# Patient Record
Sex: MALE | Race: OTHER | Employment: FULL TIME | URBAN - METROPOLITAN AREA
[De-identification: names, ages, dates, MRNs, and addresses within clinical notes are randomized per-mention and may not be internally consistent; named-entity substitution may affect disease eponyms.]

---

## 2018-12-29 ENCOUNTER — APPOINTMENT (OUTPATIENT)
Dept: GENERAL RADIOLOGY | Age: 33
End: 2018-12-29
Attending: FAMILY MEDICINE
Payer: COMMERCIAL

## 2018-12-29 ENCOUNTER — HOSPITAL ENCOUNTER (OUTPATIENT)
Age: 33
Discharge: HOME | End: 2018-12-29
Attending: FAMILY MEDICINE
Payer: COMMERCIAL

## 2018-12-29 VITALS
TEMPERATURE: 98 F | DIASTOLIC BLOOD PRESSURE: 86 MMHG | SYSTOLIC BLOOD PRESSURE: 126 MMHG | HEART RATE: 103 BPM | RESPIRATION RATE: 16 BRPM | OXYGEN SATURATION: 97 %

## 2018-12-29 DIAGNOSIS — M79.672 LEFT FOOT PAIN: ICD-10-CM

## 2018-12-29 DIAGNOSIS — J02.9 ACUTE VIRAL PHARYNGITIS: ICD-10-CM

## 2018-12-29 DIAGNOSIS — J06.9 VIRAL UPPER RESPIRATORY TRACT INFECTION: Primary | ICD-10-CM

## 2018-12-29 DIAGNOSIS — M72.2 PLANTAR FASCIITIS OF LEFT FOOT: ICD-10-CM

## 2018-12-29 LAB — POCT RAPID STREP: NEGATIVE

## 2018-12-29 PROCEDURE — 99214 OFFICE O/P EST MOD 30 MIN: CPT

## 2018-12-29 PROCEDURE — 73630 X-RAY EXAM OF FOOT: CPT | Performed by: FAMILY MEDICINE

## 2018-12-29 PROCEDURE — 87430 STREP A AG IA: CPT | Performed by: FAMILY MEDICINE

## 2018-12-29 PROCEDURE — 87081 CULTURE SCREEN ONLY: CPT | Performed by: FAMILY MEDICINE

## 2018-12-29 RX ORDER — DICLOFENAC SODIUM 75 MG/1
75 TABLET, DELAYED RELEASE ORAL 2 TIMES DAILY
Qty: 20 TABLET | Refills: 0 | Status: SHIPPED | OUTPATIENT
Start: 2018-12-29 | End: 2019-06-07

## 2018-12-29 RX ORDER — ACETAMINOPHEN 500 MG
1000 TABLET ORAL ONCE
Status: COMPLETED | OUTPATIENT
Start: 2018-12-29 | End: 2018-12-29

## 2018-12-29 RX ORDER — MULTIVIT-MINS NO.63/IRON/FOLIC 27 MG-1 MG
1 TABLET ORAL DAILY
COMMUNITY
End: 2019-06-07

## 2018-12-29 RX ORDER — FLUTICASONE PROPIONATE 50 MCG
SPRAY, SUSPENSION (ML) NASAL
Qty: 1 INHALER | Refills: 0 | Status: SHIPPED | OUTPATIENT
Start: 2018-12-29 | End: 2019-06-07

## 2018-12-29 RX ORDER — ASCORBIC ACID 500 MG
500 TABLET ORAL DAILY
COMMUNITY
End: 2020-08-25

## 2018-12-29 NOTE — ED PROVIDER NOTES
Patient Seen in: [id number] [facility]    History   Patient presents with:  Cough/URI  Sore Throat    Stated Complaint: Cough sore throat congestion and drainage    HPI    This 58-year-old male presents to the office with 2 concerns.   The first (36.8 °C) (Temporal)   Resp 16   SpO2 97%         Physical Exam    General: WH/WN/WD, in no respiratory distress, A and O times 3  HEAD: Normocephalic, atraumatic  EYES: Sclera anicteric,  conjunctiva normal.  EARS: Tympanic membranes normal, EAC's normal. discussed. He is instructed to go the emergency room if he has increased difficulty breathing. He is to follow-up with his primary doctor in 3-5 days if his cough and cold symptoms are not improving.             Disposition and Plan     Clinical Impressio fasciitis and heel spur.

## 2018-12-29 NOTE — ED INITIAL ASSESSMENT (HPI)
Pt sts cough/cold symptoms, frontal HA, sore throat since last night. No known fever. Denies SOB. Also c/o intermittent piercing pain to left heel for the past 1 year.  Pain increases when walking after sitting for a period of time or when first getting ou

## 2019-06-01 ENCOUNTER — TELEPHONE (OUTPATIENT)
Dept: FAMILY MEDICINE CLINIC | Facility: CLINIC | Age: 34
End: 2019-06-01

## 2019-06-01 NOTE — TELEPHONE ENCOUNTER
Per patient no SI/HI, patient would like to be seen for depression. Requested Monday morning appointment due to work schedule.  Patient ok with seeing DB Friday   Future Appointments   Date Time Provider Rafal Mathews   6/7/2019 10:40 AM Eva Sampson

## 2019-06-01 NOTE — TELEPHONE ENCOUNTER
Pt states he has been feeling depressed and mood swings for a little while, Wants to be seen Monday, but needs early appt because he starts work in the afternoon.   Please call

## 2019-06-07 NOTE — PROGRESS NOTES
Patient presents with:  Mood Disturbance: mood, irritable       HPI:    Patient ID: Jose Francisco Seth is a 26year old male. C/O feeling easily irritable past 4 weeks. Feels nesbitt. Feels 'down' sometimes for no reason. Overthinks scenarios at times.  Gene Physical Exam   Vitals reviewed. Constitutional: He appears well-nourished. No distress. HENT:   Mouth/Throat: Oropharynx is clear and moist.   Neck: No thyromegaly present. Cardiovascular: Normal rate and regular rhythm. No murmur heard.   Pulmo

## 2019-07-05 NOTE — PROGRESS NOTES
Patient presents with: Follow - Up: on setraline       HPI:    Patient ID: Rick Mendieta is a 26year old male. Here to follow up on depression. Doing great since start zoloft. No med s/e. He is no longer nesbitt and irritable.   Feels happy and motiva heard.  Pulmonary/Chest: Effort normal and breath sounds normal.   Psychiatric: He has a normal mood and affect.  His behavior is normal. Judgment and thought content normal.            ASSESSMENT/PLAN:   Therapeutic drug monitoring  Other depression  (prim

## 2019-12-26 NOTE — TELEPHONE ENCOUNTER
Last refill - 7/5/19 - #90 with 1 refill  Last office visit - 7/5/19 - for depression  Future Appointments   Date Time Provider Rafal Mathews   1/23/2020 10:00 AM DO AGNES Watts [facility] Zahida Sosa     Please advise.

## 2020-03-18 ENCOUNTER — TELEPHONE (OUTPATIENT)
Dept: FAMILY MEDICINE CLINIC | Facility: CLINIC | Age: 35
End: 2020-03-18

## 2020-03-18 NOTE — TELEPHONE ENCOUNTER
Please advise. Most recent OV with Dr. Delgado Rolling was 7/5/19 for depression.     Future Appointments   Date Time Provider Rafal Mathews   3/19/2020  1:15 PM Radha Abdul DO [facility] E[facility] [place]

## 2020-03-18 NOTE — TELEPHONE ENCOUNTER
Since I have never seen him, I would prefer if Dr. Garth Salgado can refill, if possible. I think it's wise to postpone the visit.

## 2020-03-18 NOTE — TELEPHONE ENCOUNTER
Pt is due to come in tomorrow to Est care with KE. Formally he was seeing Dr Chelsea Saez. He needs a refill of his Zoloft. HE does work at [employer] and doesn't know if he has has contact with the confirmed case on Covid-19.    He currently h

## 2020-06-22 ENCOUNTER — TELEMEDICINE (OUTPATIENT)
Dept: FAMILY MEDICINE CLINIC | Facility: CLINIC | Age: 35
End: 2020-06-22

## 2020-06-22 DIAGNOSIS — F32.89 OTHER DEPRESSION: ICD-10-CM

## 2020-06-22 DIAGNOSIS — Z51.81 THERAPEUTIC DRUG MONITORING: Primary | ICD-10-CM

## 2020-06-22 PROCEDURE — 99213 OFFICE O/P EST LOW 20 MIN: CPT | Performed by: FAMILY MEDICINE

## 2020-06-22 NOTE — TELEPHONE ENCOUNTER
TAPAN pt has an appt today.   Future Appointments   Date Time Provider Rafal Mathews   6/22/2020  2:00 PM Ellen Freeman MD [facility]   8/4/2020  4:15 PM Joselo Desir [facility] Velma Escalonaral

## 2020-06-22 NOTE — PROGRESS NOTES
Patient presents with:  Medication Follow-Up        HPI:    Patient ID: Gwen Kimball is a 37year old male doing a video visit today for med check. Doing well on zoloft. Depression sx are well controlled. No med s/e. Motivation good.  Denies moodiness zoloft. Side effects and therapeutic benefits of medication reviewed  F/u for annual px/BW in august 2020. No orders of the defined types were placed in this encounter.       Meds This Visit:  Requested Prescriptions      No prescriptions requested or

## 2020-08-20 ENCOUNTER — TELEPHONE (OUTPATIENT)
Dept: FAMILY MEDICINE CLINIC | Facility: CLINIC | Age: 35
End: 2020-08-20

## 2020-08-20 NOTE — TELEPHONE ENCOUNTER
Patient wife notified and accepted appt. Advised if he receives a reminder call it will say scheduled for 830.  Advised to disregard, appointment time is 8 am

## 2020-08-20 NOTE — TELEPHONE ENCOUNTER
Spouse called, pt is est care with Dr Kaitlyn Saul from Dr Chelsea Saez, Pt just got a new job, his physical is next Thursday and they want to know if we can fit pt in for physical sooner?   Pt can not start job until he has physical.  Please call pt at [phone]

## 2020-08-25 ENCOUNTER — OFFICE VISIT (OUTPATIENT)
Dept: FAMILY MEDICINE CLINIC | Facility: CLINIC | Age: 35
End: 2020-08-25

## 2020-08-25 ENCOUNTER — LAB ENCOUNTER (OUTPATIENT)
Dept: LAB | Age: 35
End: 2020-08-25
Attending: FAMILY MEDICINE
Payer: COMMERCIAL

## 2020-08-25 VITALS
SYSTOLIC BLOOD PRESSURE: 120 MMHG | BODY MASS INDEX: 33.92 KG/M2 | WEIGHT: 247.69 LBS | DIASTOLIC BLOOD PRESSURE: 84 MMHG | HEIGHT: 71.5 IN | HEART RATE: 84 BPM | TEMPERATURE: 97 F

## 2020-08-25 DIAGNOSIS — Z00.00 HEALTHY ADULT ON ROUTINE PHYSICAL EXAMINATION: ICD-10-CM

## 2020-08-25 DIAGNOSIS — R06.83 SNORING: ICD-10-CM

## 2020-08-25 DIAGNOSIS — R53.82 CHRONIC FATIGUE: ICD-10-CM

## 2020-08-25 DIAGNOSIS — Z11.1 SCREENING FOR TUBERCULOSIS: ICD-10-CM

## 2020-08-25 DIAGNOSIS — R74.8 ELEVATED LIVER ENZYMES: ICD-10-CM

## 2020-08-25 DIAGNOSIS — F32.89 OTHER DEPRESSION: ICD-10-CM

## 2020-08-25 DIAGNOSIS — Z00.00 HEALTHY ADULT ON ROUTINE PHYSICAL EXAMINATION: Primary | ICD-10-CM

## 2020-08-25 LAB
ALBUMIN SERPL-MCNC: 4 G/DL (ref 3.4–5)
ALBUMIN/GLOB SERPL: 1 {RATIO} (ref 1–2)
ALP LIVER SERPL-CCNC: 114 U/L (ref 45–117)
ALT SERPL-CCNC: 182 U/L (ref 16–61)
ANION GAP SERPL CALC-SCNC: 4 MMOL/L (ref 0–18)
AST SERPL-CCNC: 83 U/L (ref 15–37)
BASOPHILS # BLD AUTO: 0.04 X10(3) UL (ref 0–0.2)
BASOPHILS NFR BLD AUTO: 0.6 %
BILIRUB SERPL-MCNC: 0.6 MG/DL (ref 0.1–2)
BUN BLD-MCNC: 16 MG/DL (ref 7–18)
BUN/CREAT SERPL: 18.2 (ref 10–20)
CALCIUM BLD-MCNC: 9.4 MG/DL (ref 8.5–10.1)
CHLORIDE SERPL-SCNC: 107 MMOL/L (ref 98–112)
CHOLEST SMN-MCNC: 206 MG/DL (ref ?–200)
CO2 SERPL-SCNC: 28 MMOL/L (ref 21–32)
CREAT BLD-MCNC: 0.88 MG/DL (ref 0.7–1.3)
DEPRECATED RDW RBC AUTO: 44.3 FL (ref 35.1–46.3)
EOSINOPHIL # BLD AUTO: 0.14 X10(3) UL (ref 0–0.7)
EOSINOPHIL NFR BLD AUTO: 2.1 %
ERYTHROCYTE [DISTWIDTH] IN BLOOD BY AUTOMATED COUNT: 13.1 % (ref 11–15)
GLOBULIN PLAS-MCNC: 3.9 G/DL (ref 2.8–4.4)
GLUCOSE BLD-MCNC: 113 MG/DL (ref 70–99)
HCT VFR BLD AUTO: 47.9 % (ref 39–53)
HDLC SERPL-MCNC: 48 MG/DL (ref 40–59)
HGB BLD-MCNC: 15.8 G/DL (ref 13–17.5)
IMM GRANULOCYTES # BLD AUTO: 0.03 X10(3) UL (ref 0–1)
IMM GRANULOCYTES NFR BLD: 0.4 %
LDLC SERPL CALC-MCNC: 129 MG/DL (ref ?–100)
LYMPHOCYTES # BLD AUTO: 2.61 X10(3) UL (ref 1–4)
LYMPHOCYTES NFR BLD AUTO: 38.7 %
M PROTEIN MFR SERPL ELPH: 7.9 G/DL (ref 6.4–8.2)
MCH RBC QN AUTO: 30.7 PG (ref 26–34)
MCHC RBC AUTO-ENTMCNC: 33 G/DL (ref 31–37)
MCV RBC AUTO: 93 FL (ref 80–100)
MONOCYTES # BLD AUTO: 0.45 X10(3) UL (ref 0.1–1)
MONOCYTES NFR BLD AUTO: 6.7 %
NEUTROPHILS # BLD AUTO: 3.48 X10 (3) UL (ref 1.5–7.7)
NEUTROPHILS # BLD AUTO: 3.48 X10(3) UL (ref 1.5–7.7)
NEUTROPHILS NFR BLD AUTO: 51.5 %
NONHDLC SERPL-MCNC: 158 MG/DL (ref ?–130)
OSMOLALITY SERPL CALC.SUM OF ELEC: 290 MOSM/KG (ref 275–295)
PATIENT FASTING Y/N/NP: NO
PATIENT FASTING Y/N/NP: NO
PLATELET # BLD AUTO: 220 10(3)UL (ref 150–450)
POTASSIUM SERPL-SCNC: 3.5 MMOL/L (ref 3.5–5.1)
RBC # BLD AUTO: 5.15 X10(6)UL (ref 4.3–5.7)
SODIUM SERPL-SCNC: 139 MMOL/L (ref 136–145)
TRIGL SERPL-MCNC: 144 MG/DL (ref 30–149)
TSI SER-ACNC: 1.24 MIU/ML (ref 0.36–3.74)
VLDLC SERPL CALC-MCNC: 29 MG/DL (ref 0–30)
WBC # BLD AUTO: 6.8 X10(3) UL (ref 4–11)

## 2020-08-25 PROCEDURE — 83550 IRON BINDING TEST: CPT

## 2020-08-25 PROCEDURE — 99395 PREV VISIT EST AGE 18-39: CPT | Performed by: FAMILY MEDICINE

## 2020-08-25 PROCEDURE — 3079F DIAST BP 80-89 MM HG: CPT | Performed by: FAMILY MEDICINE

## 2020-08-25 PROCEDURE — 83540 ASSAY OF IRON: CPT

## 2020-08-25 PROCEDURE — 84443 ASSAY THYROID STIM HORMONE: CPT

## 2020-08-25 PROCEDURE — 80061 LIPID PANEL: CPT

## 2020-08-25 PROCEDURE — 3008F BODY MASS INDEX DOCD: CPT | Performed by: FAMILY MEDICINE

## 2020-08-25 PROCEDURE — 85025 COMPLETE CBC W/AUTO DIFF WBC: CPT

## 2020-08-25 PROCEDURE — 3074F SYST BP LT 130 MM HG: CPT | Performed by: FAMILY MEDICINE

## 2020-08-25 PROCEDURE — 86480 TB TEST CELL IMMUN MEASURE: CPT

## 2020-08-25 PROCEDURE — 80053 COMPREHEN METABOLIC PANEL: CPT

## 2020-08-25 PROCEDURE — 36415 COLL VENOUS BLD VENIPUNCTURE: CPT

## 2020-08-25 NOTE — PROGRESS NOTES
Marvin Pollard is a 38year old male who presents for a complete physical exam.   HPI:   Pt complains of needs a physical for work. 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 Just switched from nights for 11 yrs, to day shift. He is excited about this.      On yes. Children: no.   Exercise: minimal.  Diet: watches minimally     REVIEW OF SYSTEMS:   GENERAL: feels well otherwise  SKIN: denies any unusual skin lesions  EYES:denies blurred vision or double vision  HEENT: denies nasal congestion, sinus pain or ST  L Healthy adult on routine physical examination  (primary encounter diagnosis)  Screening for tuberculosis  Chronic fatigue  Snoring  Other depression  - check labs, recommended eating healthy and exercise more 150 min per week.    - if not feeling better

## 2020-08-26 ENCOUNTER — TELEPHONE (OUTPATIENT)
Dept: FAMILY MEDICINE CLINIC | Facility: CLINIC | Age: 35
End: 2020-08-26

## 2020-08-26 DIAGNOSIS — R74.8 ELEVATED LIVER ENZYMES: Primary | ICD-10-CM

## 2020-08-26 LAB
IRON SATURATION: 25 % (ref 20–50)
IRON SERPL-MCNC: 100 UG/DL (ref 65–175)
TOTAL IRON BINDING CAPACITY: 401 UG/DL (ref 240–450)
TRANSFERRIN SERPL-MCNC: 269 MG/DL (ref 200–360)

## 2020-08-26 NOTE — TELEPHONE ENCOUNTER
RN: Can we add iron/tibc and acute hepatitis panel dx elevated liver enzymes?    Pls let pt know that his tb test is still in process, but his thyroid and blood cell counts are normal. His cholesterol is a little high, but not at a level I would recommend m

## 2020-08-26 NOTE — TELEPHONE ENCOUNTER
Pt was advised- he states no ETOH or Tylenol use leading up to labs    RN advised pt that Melanie Callejas was added on and we will call with results when they are in    Routing to provider as Sylvester Simmonds

## 2020-08-27 ENCOUNTER — TELEPHONE (OUTPATIENT)
Dept: FAMILY MEDICINE CLINIC | Facility: CLINIC | Age: 35
End: 2020-08-27

## 2020-08-27 DIAGNOSIS — R74.8 ELEVATED LIVER ENZYMES: Primary | ICD-10-CM

## 2020-08-27 LAB
M TB IFN-G CD4+ T-CELLS BLD-ACNC: 0.02 IU/ML
M TB TUBERC IFN-G BLD QL: NEGATIVE
M TB TUBERC IGNF/MITOGEN IGNF CONTROL: 8.01 IU/ML
QUANTIFERON TB1 MINUS NIL: 0.01 IU/ML
QUANTIFERON TB2 MINUS NIL: 0.01 IU/ML

## 2020-08-27 NOTE — TELEPHONE ENCOUNTER
SPOUSE CALLED AND ADV SHE HAS A FEW MORE QUESTIONS THAT SHE WOULD LIKE TO ASK.     [address] [facility]

## 2020-08-27 NOTE — TELEPHONE ENCOUNTER
Spouse states pt does drink gatorade and powerade about 1-2 daily. Denies any tylenol or advil or drinking. Spouse would like liver testing repeated sooner. Per verbal from Dr. Ngoc brumfield to have these tests done sooner.  Advised pt to stop sports drinks and

## 2021-01-18 ENCOUNTER — OFFICE VISIT (OUTPATIENT)
Dept: FAMILY MEDICINE CLINIC | Facility: CLINIC | Age: 36
End: 2021-01-18

## 2021-01-18 VITALS
OXYGEN SATURATION: 98 % | BODY MASS INDEX: 35.14 KG/M2 | SYSTOLIC BLOOD PRESSURE: 118 MMHG | WEIGHT: 251 LBS | HEART RATE: 78 BPM | RESPIRATION RATE: 16 BRPM | TEMPERATURE: 98 F | DIASTOLIC BLOOD PRESSURE: 86 MMHG | HEIGHT: 71 IN

## 2021-01-18 DIAGNOSIS — R30.0 DYSURIA: ICD-10-CM

## 2021-01-18 DIAGNOSIS — R63.5 WEIGHT GAIN: ICD-10-CM

## 2021-01-18 DIAGNOSIS — M54.50 ACUTE BILATERAL LOW BACK PAIN WITHOUT SCIATICA: Primary | ICD-10-CM

## 2021-01-18 DIAGNOSIS — R74.8 ELEVATED LIVER ENZYMES: ICD-10-CM

## 2021-01-18 LAB
ALBUMIN SERPL-MCNC: 4.1 G/DL (ref 3.4–5)
ALP LIVER SERPL-CCNC: 102 U/L
ALT SERPL-CCNC: 125 U/L
ANION GAP SERPL CALC-SCNC: 5 MMOL/L (ref 0–18)
APPEARANCE: CLEAR
AST SERPL-CCNC: 52 U/L (ref 15–37)
BILIRUB DIRECT SERPL-MCNC: 0.2 MG/DL (ref 0–0.2)
BILIRUB SERPL-MCNC: 0.6 MG/DL (ref 0.1–2)
BILIRUBIN: NEGATIVE
BUN BLD-MCNC: 12 MG/DL (ref 7–18)
BUN/CREAT SERPL: 14.3 (ref 10–20)
CALCIUM BLD-MCNC: 9 MG/DL (ref 8.5–10.1)
CHLORIDE SERPL-SCNC: 103 MMOL/L (ref 98–112)
CO2 SERPL-SCNC: 27 MMOL/L (ref 21–32)
CREAT BLD-MCNC: 0.84 MG/DL
GLUCOSE (URINE DIPSTICK): NEGATIVE MG/DL
GLUCOSE BLD-MCNC: 112 MG/DL (ref 70–99)
HAV IGM SER QL: NONREACTIVE
HBV CORE IGM SER QL: NONREACTIVE
HBV SURFACE AG SERPL QL IA: NONREACTIVE
HCV AB SERPL QL IA: NONREACTIVE
KETONES (URINE DIPSTICK): NEGATIVE MG/DL
LEUKOCYTES: NEGATIVE
M PROTEIN MFR SERPL ELPH: 7.6 G/DL (ref 6.4–8.2)
MULTISTIX LOT#: 1044 NUMERIC
NITRITE, URINE: NEGATIVE
OCCULT BLOOD: NEGATIVE
OSMOLALITY SERPL CALC.SUM OF ELEC: 281 MOSM/KG (ref 275–295)
PATIENT FASTING Y/N/NP: NO
PH, URINE: 8 (ref 4.5–8)
POTASSIUM SERPL-SCNC: 3.7 MMOL/L (ref 3.5–5.1)
PROTEIN (URINE DIPSTICK): NEGATIVE MG/DL
SODIUM SERPL-SCNC: 135 MMOL/L (ref 136–145)
SPECIFIC GRAVITY: 1.01 (ref 1–1.03)
URINE-COLOR: YELLOW
UROBILINOGEN,SEMI-QN: 0.2 MG/DL (ref 0–1.9)

## 2021-01-18 PROCEDURE — 81003 URINALYSIS AUTO W/O SCOPE: CPT | Performed by: FAMILY MEDICINE

## 2021-01-18 PROCEDURE — 99214 OFFICE O/P EST MOD 30 MIN: CPT | Performed by: FAMILY MEDICINE

## 2021-01-18 PROCEDURE — 3074F SYST BP LT 130 MM HG: CPT | Performed by: FAMILY MEDICINE

## 2021-01-18 PROCEDURE — 80048 BASIC METABOLIC PNL TOTAL CA: CPT | Performed by: FAMILY MEDICINE

## 2021-01-18 PROCEDURE — 80074 ACUTE HEPATITIS PANEL: CPT | Performed by: FAMILY MEDICINE

## 2021-01-18 PROCEDURE — 80076 HEPATIC FUNCTION PANEL: CPT | Performed by: FAMILY MEDICINE

## 2021-01-18 PROCEDURE — 3079F DIAST BP 80-89 MM HG: CPT | Performed by: FAMILY MEDICINE

## 2021-01-18 PROCEDURE — 3008F BODY MASS INDEX DOCD: CPT | Performed by: FAMILY MEDICINE

## 2021-01-18 NOTE — PROGRESS NOTES
Rosemary Yoder is a 38year old male. Patient presents with:  Pain: low back pain, kidney pain      HPI:   Started last week with stabbing pain in left lower back \"kidney area\". Some on right side as well. Was trying to drink more.  Yesterday it was w unusual skin lesions or rashes  RESPIRATORY: denies shortness of breath with exertion  CARDIOVASCULAR: denies chest pain on exertion  GI: denies abdominal pain and denies heartburn  NEURO: denies headaches    EXAM:   /86 (BP Location: Left arm, Patie Order Specific Question: Release to patient          Answer: Immediate              Meds & Refills for this Visit:  Requested Prescriptions      No prescriptions requested or ordered in this encounter             The patient indicates understanding of t

## 2021-02-17 ENCOUNTER — PATIENT MESSAGE (OUTPATIENT)
Dept: FAMILY MEDICINE CLINIC | Facility: CLINIC | Age: 36
End: 2021-02-17

## 2021-02-17 NOTE — TELEPHONE ENCOUNTER
From: Iliana Garner  To: Lanny Ramirez,   Sent: 2/17/2021 5:15 PM CST  Subject: Other    Althea Momin,    I received the Moderna vaccine from [facility] on 1/19/21 and 2/17/21. Please update my immunization records.     Thank

## 2021-02-18 ENCOUNTER — TELEPHONE (OUTPATIENT)
Dept: FAMILY MEDICINE CLINIC | Facility: CLINIC | Age: 36
End: 2021-02-18

## 2021-03-26 DIAGNOSIS — F39 MOOD DISORDER (HCC): ICD-10-CM

## 2021-03-26 RX ORDER — ESCITALOPRAM OXALATE 10 MG/1
TABLET ORAL
Qty: 30 TABLET | Refills: 1 | Status: SHIPPED | OUTPATIENT
Start: 2021-03-26 | End: 2021-06-01

## 2021-03-26 NOTE — TELEPHONE ENCOUNTER
Patient notified and verbalized understanding. States he feels much better than he did on the other medication. Patient aware OV needed before further refills. States he will need to call back to schedule    KE notified of patient comments.

## 2021-03-26 NOTE — TELEPHONE ENCOUNTER
Script sent. Is it helping? Follow up with me before more refills since this was a new med in January.

## 2021-03-26 NOTE — TELEPHONE ENCOUNTER
No refill protocol for this medication. Last refill: 2-2-2021 #30 with 1 refill  Last Visit: 1-  Next Visit: No future appointments. Forward to Dr. Divya Hernandez please advise on refills. Thanks.

## 2021-06-01 DIAGNOSIS — F39 MOOD DISORDER (HCC): ICD-10-CM

## 2021-06-01 RX ORDER — ESCITALOPRAM OXALATE 10 MG/1
10 TABLET ORAL DAILY
Qty: 30 TABLET | Refills: 0 | Status: SHIPPED | OUTPATIENT
Start: 2021-06-01 | End: 2021-06-03

## 2021-06-01 NOTE — TELEPHONE ENCOUNTER
He is due for OV, see phone note 3/26/21. Please schedule. VV is okay. I can fill until his appointment.

## 2021-06-01 NOTE — TELEPHONE ENCOUNTER
Pt has been scheduled for video visit:    Future Appointments   Date Time Provider Rafal Mathews   6/3/2021  4:15 PM [facility] Kimi Ortega   6/26/2021  9:45 AM PF [facility] PF [facility]     Pt does need a refill, forward to Dr. Dinero Friday

## 2021-06-01 NOTE — TELEPHONE ENCOUNTER
No refill protocol for this medication. Last refill: 3/26/2021 #30 with 1 refill  Last Visit: 1/18/2021  Next Visit: No future appointments    Forward to Dr. Martha Muro please advise on refills. Thanks.

## 2021-06-03 NOTE — PROGRESS NOTES
This is a telemedicine visit with live, interactive video and audio. Patient understands and accepts financial responsibility for any deductible, co-insurance and/or co-pays associated with this service.     SUBJECTIVE  Medication follow up: started norm and all orders for this visit:    Mood disorder ([facility] [address]  -     escitalopram 10 MG Oral Tab; Take 1 tablet (10 mg total) by mouth daily. - continue current medication, he is happy with this. Doesn't want to change dose or med at this time.      Drug-induced e

## 2021-06-04 ENCOUNTER — TELEPHONE (OUTPATIENT)
Dept: FAMILY MEDICINE CLINIC | Facility: CLINIC | Age: 36
End: 2021-06-04

## 2021-06-04 DIAGNOSIS — N52.2 DRUG-INDUCED ERECTILE DYSFUNCTION: ICD-10-CM

## 2021-06-04 RX ORDER — SILDENAFIL 50 MG/1
TABLET, FILM COATED ORAL
Qty: 10 TABLET | Refills: 0 | Status: SHIPPED | OUTPATIENT
Start: 2021-06-04

## 2021-06-04 NOTE — TELEPHONE ENCOUNTER
Fax from [facility] stating sildenafil not covered    Per good RX can get at [facility] for $10    Patient notified and verbalized understanding.    Request script sent to [place]    Routed to  to resend    Fax sent to cancel at [place]

## 2021-06-26 ENCOUNTER — HOSPITAL ENCOUNTER (OUTPATIENT)
Dept: ULTRASOUND IMAGING | Age: 36
Discharge: HOME | End: 2021-06-26
Attending: FAMILY MEDICINE
Payer: COMMERCIAL

## 2021-06-26 DIAGNOSIS — R74.8 ELEVATED LIVER ENZYMES: ICD-10-CM

## 2021-06-26 PROCEDURE — 76700 US EXAM ABDOM COMPLETE: CPT | Performed by: FAMILY MEDICINE

## 2021-06-28 DIAGNOSIS — F39 MOOD DISORDER (HCC): ICD-10-CM

## 2021-06-28 RX ORDER — ESCITALOPRAM OXALATE 10 MG/1
10 TABLET ORAL DAILY
Qty: 90 TABLET | Refills: 0 | Status: SHIPPED | OUTPATIENT
Start: 2021-06-28 | End: 2021-09-13

## 2021-06-28 NOTE — TELEPHONE ENCOUNTER
PT CALLED AND ADV NEEDS REFILL -PT WOULD PREFER A 90 DAY REFILL     escitalopram 10 MG Oral Tab      LUZ HAGER       THANK YOU

## 2021-06-28 NOTE — TELEPHONE ENCOUNTER
Last OV 6/3/21 telemed, 1/18/21 office  Last refilled 6/3/21  #90 0 refills to [facility] and spoke with Brian Aguirre at [place] who confirmed 6/3 script not picked up.   Katherine llamas    Routed to  to resend to [facility] [address]

## 2021-07-28 ENCOUNTER — TELEPHONE (OUTPATIENT)
Dept: FAMILY MEDICINE CLINIC | Facility: CLINIC | Age: 36
End: 2021-07-28

## 2021-08-09 ENCOUNTER — TELEMEDICINE (OUTPATIENT)
Dept: FAMILY MEDICINE CLINIC | Facility: CLINIC | Age: 36
End: 2021-08-09

## 2021-08-09 DIAGNOSIS — R12 HEARTBURN: ICD-10-CM

## 2021-08-09 DIAGNOSIS — K29.00 ACUTE GASTRITIS WITHOUT HEMORRHAGE, UNSPECIFIED GASTRITIS TYPE: Primary | ICD-10-CM

## 2021-08-09 PROCEDURE — 99214 OFFICE O/P EST MOD 30 MIN: CPT | Performed by: FAMILY MEDICINE

## 2021-08-09 RX ORDER — OMEPRAZOLE 40 MG/1
40 CAPSULE, DELAYED RELEASE ORAL DAILY
Qty: 30 CAPSULE | Refills: 0 | Status: SHIPPED | OUTPATIENT
Start: 2021-08-09 | End: 2021-09-08

## 2021-08-09 NOTE — PROGRESS NOTES
This is a telemedicine visit with live, interactive video and audio. Patient understands and accepts financial responsibility for any deductible, co-insurance and/or co-pays associated with this service.     SUBJECTIVE  Over the weekend and the past wee Normocephalic, without obvious abnormality, atraumatic, lips, mucosa, and tongue normal; teeth and gums normal, Speaking in full sentences comfortably, Normal work of breathing and no abd tenderness with his palpation.      ASSESSMENT & PLAN  Diagnoses and

## 2021-09-12 DIAGNOSIS — F39 MOOD DISORDER (HCC): ICD-10-CM

## 2021-09-13 RX ORDER — ESCITALOPRAM OXALATE 10 MG/1
10 TABLET ORAL DAILY
Qty: 90 TABLET | Refills: 0 | Status: SHIPPED | OUTPATIENT
Start: 2021-09-13 | End: 2021-12-28

## 2021-12-28 DIAGNOSIS — F39 MOOD DISORDER (HCC): ICD-10-CM

## 2021-12-28 RX ORDER — ESCITALOPRAM OXALATE 10 MG/1
TABLET ORAL
Qty: 30 TABLET | Refills: 0 | Status: SHIPPED | OUTPATIENT
Start: 2021-12-28 | End: 2022-01-01

## 2022-02-27 DIAGNOSIS — F39 MOOD DISORDER (HCC): ICD-10-CM

## 2022-03-01 RX ORDER — ESCITALOPRAM OXALATE 10 MG/1
10 TABLET ORAL DAILY
Qty: 90 TABLET | Refills: 0 | Status: SHIPPED | OUTPATIENT
Start: 2022-03-01 | End: 2022-05-09

## 2022-03-01 NOTE — TELEPHONE ENCOUNTER
No refill protocol for this medication. Last refill: 1/01/2022 #30 with 0 refills  Last Visit: 8/09/2021 telemed  Next Visit: No future appointments. Forward to Dr. Dalia Pitts please advise on refills. Thanks.

## 2022-05-09 RX ORDER — SILDENAFIL 50 MG/1
TABLET, FILM COATED ORAL
Qty: 10 TABLET | Refills: 0 | Status: SHIPPED | OUTPATIENT
Start: 2022-05-09

## 2022-05-09 RX ORDER — ESCITALOPRAM OXALATE 10 MG/1
10 TABLET ORAL DAILY
Qty: 90 TABLET | Refills: 0 | Status: SHIPPED | OUTPATIENT
Start: 2022-05-09

## 2022-05-09 NOTE — TELEPHONE ENCOUNTER
Last OV 8/9/21   Last refilled:  3/1/22 escitalopram  #90  0 refills  6/4/21  Sildenafil  #10  0 refills    No future appointments.

## 2022-05-09 NOTE — TELEPHONE ENCOUNTER
Requests refill  We may already have a request from [employer] but he is switching to [place]    Sildenafil Citrate 50 MG Oral Tab    escitalopram 10 MG Oral Tab    [facility]

## 2022-11-04 ENCOUNTER — TELEPHONE (OUTPATIENT)
Dept: FAMILY MEDICINE CLINIC | Facility: CLINIC | Age: 37
End: 2022-11-04

## 2022-11-04 NOTE — TELEPHONE ENCOUNTER
Pt on video call with son, also sick this week. \" Dad also stayed home from work and is feeling better. Dad would like a doctors note for work. He missed Wednesday, yesterday, and today. Would like to return to work on Monday. Mil ALMANZAR 1985). \"     Will write a note, but needs to be seen if worsening and needs more time off. Note sent to Treatsie.

## 2023-02-10 ENCOUNTER — OFFICE VISIT (OUTPATIENT)
Dept: FAMILY MEDICINE CLINIC | Facility: CLINIC | Age: 38
End: 2023-02-10
Payer: COMMERCIAL

## 2023-02-10 VITALS
SYSTOLIC BLOOD PRESSURE: 122 MMHG | BODY MASS INDEX: 31.41 KG/M2 | TEMPERATURE: 97 F | DIASTOLIC BLOOD PRESSURE: 78 MMHG | HEIGHT: 73 IN | WEIGHT: 237 LBS | HEART RATE: 72 BPM

## 2023-02-10 DIAGNOSIS — R68.89 HEAT INTOLERANCE: ICD-10-CM

## 2023-02-10 DIAGNOSIS — R42 DIZZINESS: ICD-10-CM

## 2023-02-10 DIAGNOSIS — R63.1 EXCESSIVE THIRST: Primary | ICD-10-CM

## 2023-02-10 DIAGNOSIS — Z83.3 FAMILY HISTORY OF DIABETES MELLITUS IN FATHER: ICD-10-CM

## 2023-02-10 DIAGNOSIS — Z00.00 PREVENTATIVE HEALTH CARE: ICD-10-CM

## 2023-02-10 LAB
ALBUMIN SERPL-MCNC: 3.9 G/DL (ref 3.4–5)
ALBUMIN/GLOB SERPL: 1 {RATIO} (ref 1–2)
ALP LIVER SERPL-CCNC: 226 U/L
ALT SERPL-CCNC: 91 U/L
ANION GAP SERPL CALC-SCNC: 9 MMOL/L (ref 0–18)
AST SERPL-CCNC: 52 U/L (ref 15–37)
BASOPHILS # BLD AUTO: 0.05 X10(3) UL (ref 0–0.2)
BASOPHILS NFR BLD AUTO: 0.7 %
BILIRUB SERPL-MCNC: 0.9 MG/DL (ref 0.1–2)
BUN BLD-MCNC: 12 MG/DL (ref 7–18)
CALCIUM BLD-MCNC: 9.3 MG/DL (ref 8.5–10.1)
CHLORIDE SERPL-SCNC: 102 MMOL/L (ref 98–112)
CHOLEST SERPL-MCNC: 172 MG/DL (ref ?–200)
CO2 SERPL-SCNC: 25 MMOL/L (ref 21–32)
CREAT BLD-MCNC: 0.93 MG/DL
EOSINOPHIL # BLD AUTO: 0.17 X10(3) UL (ref 0–0.7)
EOSINOPHIL NFR BLD AUTO: 2.4 %
ERYTHROCYTE [DISTWIDTH] IN BLOOD BY AUTOMATED COUNT: 12.7 %
EST. AVERAGE GLUCOSE BLD GHB EST-MCNC: 275 MG/DL (ref 68–126)
FASTING PATIENT LIPID ANSWER: YES
FASTING STATUS PATIENT QL REPORTED: YES
GFR SERPLBLD BASED ON 1.73 SQ M-ARVRAT: 107 ML/MIN/1.73M2 (ref 60–?)
GLOBULIN PLAS-MCNC: 3.8 G/DL (ref 2.8–4.4)
GLUCOSE BLD-MCNC: 456 MG/DL (ref 70–99)
HBA1C MFR BLD: 11.2 % (ref ?–5.7)
HCT VFR BLD AUTO: 45.5 %
HDLC SERPL-MCNC: 28 MG/DL (ref 40–59)
HGB BLD-MCNC: 16.5 G/DL
IMM GRANULOCYTES # BLD AUTO: 0.04 X10(3) UL (ref 0–1)
IMM GRANULOCYTES NFR BLD: 0.6 %
LDLC SERPL CALC-MCNC: 53 MG/DL (ref ?–100)
LYMPHOCYTES # BLD AUTO: 2.41 X10(3) UL (ref 1–4)
LYMPHOCYTES NFR BLD AUTO: 34.5 %
MCH RBC QN AUTO: 31.4 PG (ref 26–34)
MCHC RBC AUTO-ENTMCNC: 36.3 G/DL (ref 31–37)
MCV RBC AUTO: 86.7 FL
MONOCYTES # BLD AUTO: 0.56 X10(3) UL (ref 0.1–1)
MONOCYTES NFR BLD AUTO: 8 %
NEUTROPHILS # BLD AUTO: 3.76 X10 (3) UL (ref 1.5–7.7)
NEUTROPHILS # BLD AUTO: 3.76 X10(3) UL (ref 1.5–7.7)
NEUTROPHILS NFR BLD AUTO: 53.8 %
NONHDLC SERPL-MCNC: 144 MG/DL (ref ?–130)
OSMOLALITY SERPL CALC.SUM OF ELEC: 302 MOSM/KG (ref 275–295)
PLATELET # BLD AUTO: 181 10(3)UL (ref 150–450)
POTASSIUM SERPL-SCNC: 4 MMOL/L (ref 3.5–5.1)
PROT SERPL-MCNC: 7.7 G/DL (ref 6.4–8.2)
RBC # BLD AUTO: 5.25 X10(6)UL
SODIUM SERPL-SCNC: 136 MMOL/L (ref 136–145)
TRIGL SERPL-MCNC: 620 MG/DL (ref 30–149)
TSI SER-ACNC: 1.74 MIU/ML (ref 0.36–3.74)
VLDLC SERPL CALC-MCNC: 90 MG/DL (ref 0–30)
WBC # BLD AUTO: 7 X10(3) UL (ref 4–11)

## 2023-02-10 PROCEDURE — 80061 LIPID PANEL: CPT | Performed by: FAMILY MEDICINE

## 2023-02-10 PROCEDURE — 83036 HEMOGLOBIN GLYCOSYLATED A1C: CPT | Performed by: FAMILY MEDICINE

## 2023-02-10 PROCEDURE — 80053 COMPREHEN METABOLIC PANEL: CPT | Performed by: FAMILY MEDICINE

## 2023-02-10 PROCEDURE — 85025 COMPLETE CBC W/AUTO DIFF WBC: CPT | Performed by: FAMILY MEDICINE

## 2023-02-10 PROCEDURE — 83540 ASSAY OF IRON: CPT | Performed by: FAMILY MEDICINE

## 2023-02-10 PROCEDURE — 84443 ASSAY THYROID STIM HORMONE: CPT | Performed by: FAMILY MEDICINE

## 2023-02-10 PROCEDURE — 83550 IRON BINDING TEST: CPT | Performed by: FAMILY MEDICINE

## 2023-02-10 PROCEDURE — 3046F HEMOGLOBIN A1C LEVEL >9.0%: CPT | Performed by: FAMILY MEDICINE

## 2023-02-10 PROCEDURE — 82728 ASSAY OF FERRITIN: CPT | Performed by: FAMILY MEDICINE

## 2023-02-11 ENCOUNTER — TELEPHONE (OUTPATIENT)
Dept: FAMILY MEDICINE CLINIC | Facility: CLINIC | Age: 38
End: 2023-02-11

## 2023-02-11 LAB
DEPRECATED HBV CORE AB SER IA-ACNC: 452.4 NG/ML
IRON SATN MFR SERPL: 23 %
IRON SERPL-MCNC: 82 UG/DL
TIBC SERPL-MCNC: 355 UG/DL (ref 240–450)
TRANSFERRIN SERPL-MCNC: 238 MG/DL (ref 200–360)

## 2023-02-11 NOTE — TELEPHONE ENCOUNTER
Received a call from lab regarding patients blood glucose of 456. Saw he was seen in office earlier today for symptoms of excessive thirst.  His ha1c is 11.2.

## 2023-02-11 NOTE — TELEPHONE ENCOUNTER
Pls let pt know that his labs show he is most definitely diabetic with a glucose reading in the 400's and an A1c of 11. His liver enzymes and triglycerides are also high. I would like to discuss these more with him and discuss medications for diabetes. Please schedule a follow up jagjit next week. In the mean time, start cutting back on foods high is sugars and carbohydrates. VV is okay if he cannot come into office.

## 2023-02-14 ENCOUNTER — TELEMEDICINE (OUTPATIENT)
Dept: FAMILY MEDICINE CLINIC | Facility: CLINIC | Age: 38
End: 2023-02-14
Payer: COMMERCIAL

## 2023-02-14 DIAGNOSIS — J06.9 VIRAL UPPER RESPIRATORY TRACT INFECTION: ICD-10-CM

## 2023-02-14 DIAGNOSIS — E11.65 TYPE 2 DIABETES MELLITUS WITH HYPERGLYCEMIA, WITHOUT LONG-TERM CURRENT USE OF INSULIN (HCC): Primary | ICD-10-CM

## 2023-02-14 PROCEDURE — 99214 OFFICE O/P EST MOD 30 MIN: CPT | Performed by: FAMILY MEDICINE

## 2023-02-14 RX ORDER — METFORMIN HYDROCHLORIDE 500 MG/1
1000 TABLET, EXTENDED RELEASE ORAL 2 TIMES DAILY WITH MEALS
Qty: 360 TABLET | Refills: 0 | Status: SHIPPED | OUTPATIENT
Start: 2023-02-14

## 2023-02-15 RX ORDER — BLOOD-GLUCOSE METER
1 EACH MISCELLANEOUS 2 TIMES DAILY
Qty: 1 KIT | Refills: 0 | Status: SHIPPED | OUTPATIENT
Start: 2023-02-15

## 2023-02-15 RX ORDER — PERPHENAZINE 16 MG/1
TABLET, FILM COATED ORAL
Qty: 200 EACH | Refills: 0 | Status: SHIPPED | OUTPATIENT
Start: 2023-02-15

## 2023-02-15 RX ORDER — LANCETS
1 EACH MISCELLANEOUS 2 TIMES DAILY
Qty: 200 EACH | Refills: 0 | Status: SHIPPED | OUTPATIENT
Start: 2023-02-15 | End: 2024-02-15

## 2023-02-17 ENCOUNTER — TELEPHONE (OUTPATIENT)
Dept: ENDOCRINOLOGY CLINIC | Facility: CLINIC | Age: 38
End: 2023-02-17

## 2023-02-25 ENCOUNTER — TELEMEDICINE (OUTPATIENT)
Dept: FAMILY MEDICINE CLINIC | Facility: CLINIC | Age: 38
End: 2023-02-25
Payer: COMMERCIAL

## 2023-02-25 DIAGNOSIS — F39 MOOD DISORDER (HCC): ICD-10-CM

## 2023-02-25 DIAGNOSIS — S30.812A ABRASION OF PENIS, INITIAL ENCOUNTER: ICD-10-CM

## 2023-02-25 DIAGNOSIS — E11.65 TYPE 2 DIABETES MELLITUS WITH HYPERGLYCEMIA, WITHOUT LONG-TERM CURRENT USE OF INSULIN (HCC): Primary | ICD-10-CM

## 2023-02-25 PROCEDURE — 99214 OFFICE O/P EST MOD 30 MIN: CPT | Performed by: FAMILY MEDICINE

## 2023-02-27 RX ORDER — ESCITALOPRAM OXALATE 10 MG/1
10 TABLET ORAL DAILY
Qty: 90 TABLET | Refills: 0 | Status: SHIPPED | OUTPATIENT
Start: 2023-02-27

## 2023-05-21 DIAGNOSIS — F39 MOOD DISORDER (HCC): ICD-10-CM

## 2023-05-22 RX ORDER — ESCITALOPRAM OXALATE 10 MG/1
10 TABLET ORAL DAILY
Qty: 90 TABLET | Refills: 0 | Status: SHIPPED | OUTPATIENT
Start: 2023-05-22

## 2023-05-26 ENCOUNTER — OFFICE VISIT (OUTPATIENT)
Dept: FAMILY MEDICINE CLINIC | Facility: CLINIC | Age: 38
End: 2023-05-26
Payer: COMMERCIAL

## 2023-05-26 VITALS
TEMPERATURE: 98 F | RESPIRATION RATE: 20 BRPM | SYSTOLIC BLOOD PRESSURE: 120 MMHG | HEART RATE: 76 BPM | HEIGHT: 73 IN | BODY MASS INDEX: 29.22 KG/M2 | OXYGEN SATURATION: 98 % | DIASTOLIC BLOOD PRESSURE: 72 MMHG | WEIGHT: 220.5 LBS

## 2023-05-26 DIAGNOSIS — E11.65 TYPE 2 DIABETES MELLITUS WITH HYPERGLYCEMIA, WITHOUT LONG-TERM CURRENT USE OF INSULIN (HCC): ICD-10-CM

## 2023-05-26 DIAGNOSIS — E78.1 HYPERTRIGLYCERIDEMIA: ICD-10-CM

## 2023-05-26 DIAGNOSIS — Z00.00 HEALTHY ADULT ON ROUTINE PHYSICAL EXAMINATION: Primary | ICD-10-CM

## 2023-05-26 DIAGNOSIS — F39 MOOD DISORDER (HCC): ICD-10-CM

## 2023-05-26 DIAGNOSIS — R79.89 HIGH SERUM FERRITIN: ICD-10-CM

## 2023-05-26 LAB
ALBUMIN SERPL-MCNC: 4.2 G/DL (ref 3.4–5)
ALBUMIN/GLOB SERPL: 1.1 {RATIO} (ref 1–2)
ALP LIVER SERPL-CCNC: 115 U/L
ALT SERPL-CCNC: 54 U/L
ANION GAP SERPL CALC-SCNC: 3 MMOL/L (ref 0–18)
AST SERPL-CCNC: 31 U/L (ref 15–37)
BILIRUB SERPL-MCNC: 0.9 MG/DL (ref 0.1–2)
BUN BLD-MCNC: 20 MG/DL (ref 7–18)
CALCIUM BLD-MCNC: 9.6 MG/DL (ref 8.5–10.1)
CHLORIDE SERPL-SCNC: 108 MMOL/L (ref 98–112)
CHOLEST SERPL-MCNC: 222 MG/DL (ref ?–200)
CO2 SERPL-SCNC: 27 MMOL/L (ref 21–32)
CREAT BLD-MCNC: 0.86 MG/DL
CREAT UR-SCNC: 120 MG/DL
DEPRECATED HBV CORE AB SER IA-ACNC: 126.9 NG/ML
EST. AVERAGE GLUCOSE BLD GHB EST-MCNC: 117 MG/DL (ref 68–126)
FASTING PATIENT LIPID ANSWER: NO
FASTING STATUS PATIENT QL REPORTED: NO
GFR SERPLBLD BASED ON 1.73 SQ M-ARVRAT: 113 ML/MIN/1.73M2 (ref 60–?)
GLOBULIN PLAS-MCNC: 3.9 G/DL (ref 2.8–4.4)
GLUCOSE BLD-MCNC: 105 MG/DL (ref 70–99)
HBA1C MFR BLD: 5.7 % (ref ?–5.7)
HDLC SERPL-MCNC: 50 MG/DL (ref 40–59)
LDLC SERPL CALC-MCNC: 134 MG/DL (ref ?–100)
MICROALBUMIN UR-MCNC: 1.06 MG/DL
MICROALBUMIN/CREAT 24H UR-RTO: 8.8 UG/MG (ref ?–30)
NONHDLC SERPL-MCNC: 172 MG/DL (ref ?–130)
OSMOLALITY SERPL CALC.SUM OF ELEC: 289 MOSM/KG (ref 275–295)
POTASSIUM SERPL-SCNC: 4.1 MMOL/L (ref 3.5–5.1)
PROT SERPL-MCNC: 8.1 G/DL (ref 6.4–8.2)
SODIUM SERPL-SCNC: 138 MMOL/L (ref 136–145)
TRIGL SERPL-MCNC: 214 MG/DL (ref 30–149)
VLDLC SERPL CALC-MCNC: 39 MG/DL (ref 0–30)

## 2023-05-26 PROCEDURE — 3008F BODY MASS INDEX DOCD: CPT | Performed by: FAMILY MEDICINE

## 2023-05-26 PROCEDURE — 3074F SYST BP LT 130 MM HG: CPT | Performed by: FAMILY MEDICINE

## 2023-05-26 PROCEDURE — 83036 HEMOGLOBIN GLYCOSYLATED A1C: CPT | Performed by: FAMILY MEDICINE

## 2023-05-26 PROCEDURE — 80061 LIPID PANEL: CPT | Performed by: FAMILY MEDICINE

## 2023-05-26 PROCEDURE — 82570 ASSAY OF URINE CREATININE: CPT | Performed by: FAMILY MEDICINE

## 2023-05-26 PROCEDURE — 3078F DIAST BP <80 MM HG: CPT | Performed by: FAMILY MEDICINE

## 2023-05-26 PROCEDURE — 99395 PREV VISIT EST AGE 18-39: CPT | Performed by: FAMILY MEDICINE

## 2023-05-26 PROCEDURE — 80053 COMPREHEN METABOLIC PANEL: CPT | Performed by: FAMILY MEDICINE

## 2023-05-26 PROCEDURE — 82728 ASSAY OF FERRITIN: CPT | Performed by: FAMILY MEDICINE

## 2023-05-26 PROCEDURE — 82043 UR ALBUMIN QUANTITATIVE: CPT | Performed by: FAMILY MEDICINE

## 2023-05-26 RX ORDER — ESCITALOPRAM OXALATE 10 MG/1
10 TABLET ORAL DAILY
Qty: 90 TABLET | Refills: 0 | Status: SHIPPED | OUTPATIENT
Start: 2023-05-26

## 2023-05-26 RX ORDER — BLOOD-GLUCOSE METER
EACH MISCELLANEOUS
COMMUNITY
Start: 2023-02-15

## 2023-05-26 RX ORDER — METFORMIN HYDROCHLORIDE 500 MG/1
500 TABLET, EXTENDED RELEASE ORAL 2 TIMES DAILY WITH MEALS
Qty: 180 TABLET | Refills: 0 | Status: SHIPPED | OUTPATIENT
Start: 2023-05-26

## 2023-06-09 ENCOUNTER — TELEMEDICINE (OUTPATIENT)
Dept: FAMILY MEDICINE CLINIC | Facility: CLINIC | Age: 38
End: 2023-06-09

## 2023-06-09 DIAGNOSIS — J30.89 ENVIRONMENTAL AND SEASONAL ALLERGIES: Primary | ICD-10-CM

## 2023-06-09 PROCEDURE — 99213 OFFICE O/P EST LOW 20 MIN: CPT | Performed by: FAMILY MEDICINE

## 2023-06-09 RX ORDER — MONTELUKAST SODIUM 10 MG/1
10 TABLET ORAL NIGHTLY
Qty: 30 TABLET | Refills: 0 | Status: SHIPPED | OUTPATIENT
Start: 2023-06-09

## 2023-07-06 DIAGNOSIS — J30.89 ENVIRONMENTAL AND SEASONAL ALLERGIES: ICD-10-CM

## 2023-07-07 RX ORDER — MONTELUKAST SODIUM 10 MG/1
10 TABLET ORAL NIGHTLY
Qty: 30 TABLET | Refills: 0 | Status: SHIPPED | OUTPATIENT
Start: 2023-07-07

## 2023-07-07 NOTE — TELEPHONE ENCOUNTER
Last OV:05/26/2023  Last refill:06/09/2023, 30 tabs, 0 refill    Medication pended, please sign if appropriate

## 2023-07-11 ENCOUNTER — TELEMEDICINE (OUTPATIENT)
Dept: FAMILY MEDICINE CLINIC | Facility: CLINIC | Age: 38
End: 2023-07-11
Payer: COMMERCIAL

## 2023-07-11 DIAGNOSIS — J01.10 SUBACUTE FRONTAL SINUSITIS: Primary | ICD-10-CM

## 2023-07-11 PROCEDURE — 99212 OFFICE O/P EST SF 10 MIN: CPT | Performed by: NURSE PRACTITIONER

## 2023-07-11 RX ORDER — AMOXICILLIN AND CLAVULANATE POTASSIUM 875; 125 MG/1; MG/1
1 TABLET, FILM COATED ORAL 2 TIMES DAILY
Qty: 14 TABLET | Refills: 0 | Status: SHIPPED | OUTPATIENT
Start: 2023-07-11 | End: 2023-07-18

## 2023-07-11 NOTE — PROGRESS NOTES
VIDEO VISIT    CHIEF COMPLAINT:  Patient presents with:  Sinus Problem      HISTORY OF PRESENT ILLNESS:  This is a 42year old male who verbally consents to a video visit today for possible sinus infection. started about 2 weeks ago  nasal pressure  face hurting  postnasal drip  wakes up in the morning hacking  humidifier has provided minimal relief  denies use of OTC for management of symptoms  denies ear pain or fever    ALLERGIES:    Shellfish-Derived P*    ANAPHYLAXIS    CURRENT MEDICATIONS:  Current Outpatient Medications   Medication Sig Dispense Refill    amoxicillin clavulanate 875-125 MG Oral Tab Take 1 tablet by mouth 2 (two) times daily for 7 days. 14 tablet 0    montelukast 10 MG Oral Tab Take 1 tablet (10 mg total) by mouth nightly. 30 tablet 0    Blood Glucose Monitoring Suppl (CONTOUR NEXT ONE) Does not apply Device TEST ONCE EACH MORNING AND BEFORE BEDTIME      escitalopram 10 MG Oral Tab Take 1 tablet (10 mg total) by mouth daily. 90 tablet 0    metFORMIN  MG Oral Tablet 24 Hr Take 1 tablet (500 mg total) by mouth 2 (two) times daily with meals. 180 tablet 0    Blood Glucose Monitoring Suppl (CONTOUR NEXT MONITOR) w/Device Does not apply Kit 1 each  in the morning and 1 each before bedtime. 1 kit 0    Glucose Blood (CONTOUR NEXT TEST) In Vitro Strip Test twice daily as directed. 200 each 0    Accu-Chek Softclix Lancets Does not apply Misc 1 lancet. by Finger stick route in the morning and 1 lancet. before bedtime. Use as directed. . 200 each 0    Sildenafil Citrate 50 MG Oral Tab 1/2 to 1 tab as needed 30 min before intercourse. 10 tablet 0    Multiple Vitamins-Minerals (MULTIVITAMIN ADULT OR) Take by mouth. MEDICAL HISTORY:  Past Medical History:   Diagnosis Date    Depression      History reviewed. No pertinent surgical history.   Family History   Problem Relation Age of Onset    Cancer Neg     Heart Disease Neg     Stroke Neg     Diabetes Neg      No family status information on file.     Social History     Socioeconomic History    Marital status:    Tobacco Use    Smoking status: Former    Smokeless tobacco: Never    Tobacco comments:     Occasional cigarette more than 20 years ago. Vaping Use    Vaping Use: Never used   Substance and Sexual Activity    Alcohol use: Yes     Alcohol/week: 1.0 standard drink of alcohol     Types: 1 Cans of beer per week     Comment: Rarely now. Drug use: No   Other Topics Concern    Caffeine Concern No    Exercise Yes     Comment: Very active at work. Seat Belt Yes     Comment: While driving. Special Diet Yes     Comment: Low carb diet now. Stress Concern No    Weight Concern No       ROS:    GENERAL:  Denies fever or chills  RESPIRATORY:  + difficulty breathing out of nostrils d/t nasal congestion  CARDIO:  Denies swelling of legs or chest pain with exertion  NEURO:  Denies recent falls or sudden changes of vision  PSYCH: Denies suicidal or homicidal ideations    VITALS:  No vitals were obtained by clinical staff during this visit. PHYSICAL EXAM:    Physical exam is limited due to video visit. Eyad Delgado serves as a reliable historian. Speech is clear and organized without difficulty speaking in full sentences. No shortness of breath or cough noted during our conversation. Reports pain with palpation of frontal and paranasal sinuses. ASSESSMENT & PLAN:    1. Subacute frontal sinusitis  - amoxicillin clavulanate 875-125 MG Oral Tab; Take 1 tablet by mouth 2 (two) times daily for 7 days. Dispense: 14 tablet;  Refill: 0

## 2023-07-11 NOTE — PATIENT INSTRUCTIONS
Parveen Mccormick,    Note has been written and should be found in mychart    Please take full 7 days of abx as prescribed without missing doses    Call clinic on the 13th if symptoms are not improving    Follow-up in office if symptoms have not resolved by July 18th, 2023

## 2023-08-05 DIAGNOSIS — J30.89 ENVIRONMENTAL AND SEASONAL ALLERGIES: ICD-10-CM

## 2023-08-07 RX ORDER — MONTELUKAST SODIUM 10 MG/1
10 TABLET ORAL NIGHTLY
Qty: 90 TABLET | Refills: 0 | Status: SHIPPED | OUTPATIENT
Start: 2023-08-07

## 2023-08-31 DIAGNOSIS — E11.65 TYPE 2 DIABETES MELLITUS WITH HYPERGLYCEMIA, WITHOUT LONG-TERM CURRENT USE OF INSULIN (HCC): ICD-10-CM

## 2023-08-31 RX ORDER — METFORMIN HYDROCHLORIDE 500 MG/1
500 TABLET, EXTENDED RELEASE ORAL 2 TIMES DAILY WITH MEALS
Qty: 180 TABLET | Refills: 0 | Status: SHIPPED | OUTPATIENT
Start: 2023-08-31

## 2023-10-31 DIAGNOSIS — J30.89 ENVIRONMENTAL AND SEASONAL ALLERGIES: ICD-10-CM

## 2023-11-01 NOTE — TELEPHONE ENCOUNTER
Asthma & COPD Medication Protocol [id number]/31/2023 08:12 PM    Asthma Action Score greater than or equal to 20    AAP/ACT given in last 12 months    Appointment in past 6 or next 3 months      Routing to provider per protocol.   montelukast 10 MG Oral Tab   Last refilled on 8/7/23 for #90  with 0 rf.   Last labs 5/26/23.   Last seen on 5/26/23.     No future appointments.       Thank you.

## 2023-11-02 RX ORDER — MONTELUKAST SODIUM 10 MG/1
10 TABLET ORAL NIGHTLY
Qty: 90 TABLET | Refills: 0 | Status: SHIPPED | OUTPATIENT
Start: 2023-11-02 | End: 2024-01-29

## 2023-11-16 DIAGNOSIS — F39 MOOD DISORDER (HCC): ICD-10-CM

## 2023-11-16 RX ORDER — ESCITALOPRAM OXALATE 10 MG/1
10 TABLET ORAL DAILY
Qty: 90 TABLET | Refills: 0 | Status: SHIPPED | OUTPATIENT
Start: 2023-11-16

## 2023-11-29 DIAGNOSIS — E11.65 TYPE 2 DIABETES MELLITUS WITH HYPERGLYCEMIA, WITHOUT LONG-TERM CURRENT USE OF INSULIN (HCC): ICD-10-CM

## 2023-11-29 RX ORDER — METFORMIN HYDROCHLORIDE 500 MG/1
500 TABLET, EXTENDED RELEASE ORAL 2 TIMES DAILY WITH MEALS
Qty: 180 TABLET | Refills: 0 | Status: SHIPPED | OUTPATIENT
Start: 2023-11-29 | End: 2024-01-18

## 2023-11-29 NOTE — TELEPHONE ENCOUNTER
Diabetic Medication Protocol [id number]/29/2023 09:48 AM   Protocol Details HgBA1C procedure resulted in past 6 months    Last HgBA1C < 7.5    Appointment in past 6 or next 3 months    Microalbumin procedure in past 12 months or taking ACE/ARB   Routing to provider per protocol.   METFORMIN  MG Oral Tablet 24 Hr   Last refilled on 8/31/23 for #180  with 0 rf.   Last labs 5/26/23.   Last seen on 5/26/23.     No future appointments.       Thank you.

## 2024-01-18 ENCOUNTER — TELEMEDICINE (OUTPATIENT)
Dept: FAMILY MEDICINE CLINIC | Facility: CLINIC | Age: 39
End: 2024-01-18
Payer: COMMERCIAL

## 2024-01-18 DIAGNOSIS — E11.65 TYPE 2 DIABETES MELLITUS WITH HYPERGLYCEMIA, WITHOUT LONG-TERM CURRENT USE OF INSULIN (HCC): ICD-10-CM

## 2024-01-18 DIAGNOSIS — F39 MOOD DISORDER (HCC): ICD-10-CM

## 2024-01-18 DIAGNOSIS — J06.9 VIRAL UPPER RESPIRATORY TRACT INFECTION: Primary | ICD-10-CM

## 2024-01-18 DIAGNOSIS — N52.2 DRUG-INDUCED ERECTILE DYSFUNCTION: ICD-10-CM

## 2024-01-18 PROCEDURE — 99214 OFFICE O/P EST MOD 30 MIN: CPT | Performed by: FAMILY MEDICINE

## 2024-01-18 RX ORDER — ESCITALOPRAM OXALATE 10 MG/1
10 TABLET ORAL DAILY
Qty: 90 TABLET | Refills: 0 | Status: SHIPPED | OUTPATIENT
Start: 2024-01-18

## 2024-01-18 RX ORDER — METFORMIN HYDROCHLORIDE 500 MG/1
500 TABLET, EXTENDED RELEASE ORAL 2 TIMES DAILY WITH MEALS
Qty: 180 TABLET | Refills: 0 | Status: SHIPPED | OUTPATIENT
Start: 2024-01-18

## 2024-01-18 RX ORDER — SILDENAFIL 50 MG/1
TABLET, FILM COATED ORAL
Qty: 10 TABLET | Refills: 0 | Status: SHIPPED | OUTPATIENT
Start: 2024-01-18

## 2024-01-18 NOTE — PROGRESS NOTES
This is a telemedicine visit with live, interactive video and audio.     Patient understands and accepts financial responsibility for any deductible, co-insurance and/or co-pays associated with this service.    SUBJECTIVE  Started getting sick last night with sinus pressure and nasal drainage.   Today has a lot of drainage and mucous.   Taking sudafed sinus, more started drainage.    and with a headache today.   No fevers. No body aches.   No throat pain or ear pain.   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   Tested negative for COVID.   Typically around this time of year, he gets pressure and sinus drainage.     No SOB or chest tightness.     Needs refills.     Missed work today. Would like a note for work. Is going to try and go back to work tomorrow.       HISTORY:  Past Medical History:   Diagnosis Date    Depression       No past surgical history on file.   Family History   Problem Relation Age of Onset    Cancer Neg     Heart Disease Neg     Stroke Neg     Diabetes Neg       Social History     Socioeconomic History    Marital status:    Tobacco Use    Smoking status: Former    Smokeless tobacco: Never    Tobacco comments:     Occasional cigarette more than 20 years ago.    Vaping Use    Vaping Use: Never used   Substance and Sexual Activity    Alcohol use: Yes     Alcohol/week: 1.0 standard drink of alcohol     Types: 1 Cans of beer per week     Comment: Rarely now.    Drug use: No   Other Topics Concern    Caffeine Concern No    Exercise Yes     Comment: Very active at work.    Seat Belt Yes     Comment: While driving.    Special Diet Yes     Comment: Low carb diet now.    Stress Concern No    Weight Concern No        Allergies   Allergen Reactions    Shellfish-Derived Products ANAPHYLAXIS      Current Outpatient Medications   Medication Sig Dispense Refill    escitalopram 10 MG Oral Tab Take 1 tablet (10 mg total) by mouth daily. 90 tablet 0    metFORMIN  MG Oral  Tablet 24 Hr Take 1 tablet (500 mg total) by mouth 2 (two) times daily with meals. 180 tablet 0    Sildenafil Citrate 50 MG Oral Tab 1/2 to 1 tab as needed 30 min before intercourse. 10 tablet 0    montelukast 10 MG Oral Tab Take 1 tablet (10 mg total) by mouth nightly. 90 tablet 0    Blood Glucose Monitoring Suppl (CONTOUR NEXT ONE) Does not apply Device TEST ONCE EACH MORNING AND BEFORE BEDTIME      Blood Glucose Monitoring Suppl (CONTOUR NEXT MONITOR) w/Device Does not apply Kit 1 each  in the morning and 1 each before bedtime. 1 kit 0    Glucose Blood (CONTOUR NEXT TEST) In Vitro Strip Test twice daily as directed. 200 each 0    Accu-Chek Softclix Lancets Does not apply Misc 1 lancet. by Finger stick route in the morning and 1 lancet. before bedtime. Use as directed.. 200 each 0    Multiple Vitamins-Minerals (MULTIVITAMIN ADULT OR) Take by mouth.         OBJECTIVE  Physical Exam:   alert, appears stated age, and cooperative and Normocephalic, without obvious abnormality, atraumatic, + pressure and tenderness in frontal sinus area b/l   Normal respiratory sentence, no coughing or distress     ASSESSMENT & PLAN  Diagnoses and all orders for this visit:    Viral upper respiratory tract infection  - Advised to continue supportive care with drinking lots of water, getting more rest, mucinex for congestion and tylenol or motrin for pain. Honey, chloraseptic spray or lozenges can help with sore throat.   - if not getting better next week, then consider antibiotic, but it's too early at this point, since it's less than 24 hours into symptoms.     Mood disorder (HCC)  -     escitalopram 10 MG Oral Tab; Take 1 tablet (10 mg total) by mouth daily.    Type 2 diabetes mellitus with hyperglycemia, without long-term current use of insulin (HCC)  -     metFORMIN  MG Oral Tablet 24 Hr; Take 1 tablet (500 mg total) by mouth 2 (two) times daily with meals.    Drug-induced erectile dysfunction  -     Sildenafil Citrate 50 MG  Oral Tab; 1/2 to 1 tab as needed 30 min before intercourse.    Follow up for DM check up anytime.         Dahiana Haro, DO    Please note that the following visit was completed using two-way, real-time interactive audio and/or video communication.  This has been done in good priscila to provide continuity of care in the best interest of the provider-patient relationship, due to the ongoing public health crisis/national emergency and because of restrictions of visitation.  There are limitations of this visit as no physical exam could be performed.  Every conscious effort was taken to allow for sufficient and adequate time.  This billing was spent on reviewing labs, medications, radiology tests and decision making.  Appropriate medical decision-making and tests are ordered as detailed in the plan of care above.

## 2024-01-28 DIAGNOSIS — J30.89 ENVIRONMENTAL AND SEASONAL ALLERGIES: ICD-10-CM

## 2024-01-29 RX ORDER — MONTELUKAST SODIUM 10 MG/1
10 TABLET ORAL NIGHTLY
Qty: 90 TABLET | Refills: 0 | Status: SHIPPED | OUTPATIENT
Start: 2024-01-29

## 2024-03-01 NOTE — LETTER
Date: 7/11/2023    Patient Name: Jimena Soto          To Whom it may concern: This letter has been written at the patient's request. The above patient was seen at the [facility] for treatment of a medical condition. This patient should be excused from attending work July 11th and July 12th, 2023. May return to work July 13th, 2023 as long as symptoms are improving and remains without fever.       Sincerely,        BRAD Sanchez - hold home losartan, toprol iso hypovolemic shock; resume prn Home regimen metformin 1g qd, tradjenta 5mg qd    - f/u A1c  - c/w INOCENTE, FSG ACHS or q6h if NPO; goal glucose

## 2024-04-19 ENCOUNTER — OFFICE VISIT (OUTPATIENT)
Dept: FAMILY MEDICINE CLINIC | Facility: CLINIC | Age: 39
End: 2024-04-19
Payer: COMMERCIAL

## 2024-04-19 VITALS
BODY MASS INDEX: 31 KG/M2 | OXYGEN SATURATION: 97 % | TEMPERATURE: 97 F | SYSTOLIC BLOOD PRESSURE: 102 MMHG | HEART RATE: 88 BPM | WEIGHT: 233 LBS | DIASTOLIC BLOOD PRESSURE: 68 MMHG | RESPIRATION RATE: 18 BRPM

## 2024-04-19 DIAGNOSIS — R30.0 DYSURIA: Primary | ICD-10-CM

## 2024-04-19 DIAGNOSIS — E11.65 TYPE 2 DIABETES MELLITUS WITH HYPERGLYCEMIA, WITHOUT LONG-TERM CURRENT USE OF INSULIN (HCC): ICD-10-CM

## 2024-04-19 LAB
APPEARANCE: CLEAR
BILIRUBIN: NEGATIVE
CREAT UR-SCNC: 194 MG/DL
GLUCOSE (URINE DIPSTICK): NEGATIVE MG/DL
KETONES (URINE DIPSTICK): NEGATIVE MG/DL
LEUKOCYTES: NEGATIVE
MICROALBUMIN UR-MCNC: 2 MG/DL
MICROALBUMIN/CREAT 24H UR-RTO: 10.3 UG/MG (ref ?–30)
MULTISTIX LOT#: NORMAL NUMERIC
NITRITE, URINE: NEGATIVE
OCCULT BLOOD: NEGATIVE
PH, URINE: 5.5 (ref 4.5–8)
PROTEIN (URINE DIPSTICK): NEGATIVE MG/DL
SPECIFIC GRAVITY: 1.03 (ref 1–1.03)
URINE-COLOR: YELLOW
UROBILINOGEN,SEMI-QN: 0.2 MG/DL (ref 0–1.9)

## 2024-04-19 PROCEDURE — 87086 URINE CULTURE/COLONY COUNT: CPT | Performed by: FAMILY MEDICINE

## 2024-04-19 PROCEDURE — 99214 OFFICE O/P EST MOD 30 MIN: CPT | Performed by: FAMILY MEDICINE

## 2024-04-19 PROCEDURE — 3074F SYST BP LT 130 MM HG: CPT | Performed by: FAMILY MEDICINE

## 2024-04-19 PROCEDURE — 82570 ASSAY OF URINE CREATININE: CPT | Performed by: FAMILY MEDICINE

## 2024-04-19 PROCEDURE — 3078F DIAST BP <80 MM HG: CPT | Performed by: FAMILY MEDICINE

## 2024-04-19 PROCEDURE — 82043 UR ALBUMIN QUANTITATIVE: CPT | Performed by: FAMILY MEDICINE

## 2024-04-19 PROCEDURE — 81003 URINALYSIS AUTO W/O SCOPE: CPT | Performed by: FAMILY MEDICINE

## 2024-04-19 RX ORDER — CHOLECALCIFEROL (VITAMIN D3) 125 MCG
500 CAPSULE ORAL DAILY
COMMUNITY

## 2024-04-19 RX ORDER — CIPROFLOXACIN 250 MG/1
250 TABLET, FILM COATED ORAL 2 TIMES DAILY
Qty: 10 TABLET | Refills: 0 | Status: SHIPPED | OUTPATIENT
Start: 2024-04-19 | End: 2024-04-24

## 2024-04-19 NOTE — PROGRESS NOTES
Homer Headley is a 38 year old male.  Chief Complaint   Patient presents with    UTI       HPI:   Urinating frequently, sometimes just a little bit. Started this past week.   Feels like he has to pee, but takes a long time to go.   Has aching in lower back.   No h/o bladder infections.   No fevers.   No blood in urin.   No foul odor to urine.   Drinking lots of water.   Started cranberry juice, that helped some.   The only thing that changed is that he started taking b12 for energy.       ALLERGIES:  Allergies   Allergen Reactions    Shellfish-Derived Products ANAPHYLAXIS         Current Outpatient Medications   Medication Sig Dispense Refill    cyanocobalamin 500 MCG Oral Tab Take 1 tablet (500 mcg total) by mouth daily.      ciprofloxacin 250 MG Oral Tab Take 1 tablet (250 mg total) by mouth 2 (two) times daily for 5 days. 10 tablet 0    montelukast 10 MG Oral Tab Take 1 tablet (10 mg total) by mouth nightly. 90 tablet 0    escitalopram 10 MG Oral Tab Take 1 tablet (10 mg total) by mouth daily. 90 tablet 0    metFORMIN  MG Oral Tablet 24 Hr Take 1 tablet (500 mg total) by mouth 2 (two) times daily with meals. 180 tablet 0    Sildenafil Citrate 50 MG Oral Tab 1/2 to 1 tab as needed 30 min before intercourse. 10 tablet 0    Multiple Vitamins-Minerals (MULTIVITAMIN ADULT OR) Take by mouth.      Blood Glucose Monitoring Suppl (CONTOUR NEXT ONE) Does not apply Device TEST ONCE EACH MORNING AND BEFORE BEDTIME (Patient not taking: Reported on 4/19/2024)      Blood Glucose Monitoring Suppl (CONTOUR NEXT MONITOR) w/Device Does not apply Kit 1 each  in the morning and 1 each before bedtime. (Patient not taking: Reported on 4/19/2024) 1 kit 0    Glucose Blood (CONTOUR NEXT TEST) In Vitro Strip Test twice daily as directed. (Patient not taking: Reported on 4/19/2024) 200 each 0      Past Medical History:    Depression      Social History:  Social History     Socioeconomic History    Marital status:    Tobacco  Use    Smoking status: Former    Smokeless tobacco: Never    Tobacco comments:     Occasional cigarette more than 20 years ago.    Vaping Use    Vaping status: Never Used   Substance and Sexual Activity    Alcohol use: Yes     Alcohol/week: 1.0 standard drink of alcohol     Types: 1 Cans of beer per week     Comment: Rarely now.    Drug use: No   Other Topics Concern    Caffeine Concern No    Exercise Yes     Comment: Very active at work.    Seat Belt Yes     Comment: While driving.    Special Diet Yes     Comment: Low carb diet now.    Stress Concern No    Weight Concern No        BP Readings from Last 6 Encounters:   04/19/24 102/68   05/26/23 120/72   02/10/23 122/78   01/18/21 118/86   08/25/20 120/84   07/05/19 118/78       Wt Readings from Last 6 Encounters:   04/19/24 233 lb (105.7 kg)   05/26/23 220 lb 8 oz (100 kg)   02/10/23 237 lb (107.5 kg)   01/18/21 251 lb (113.9 kg)   08/25/20 247 lb 11.2 oz (112.4 kg)   07/05/19 235 lb (106.6 kg)       REVIEW OF SYSTEMS:   GENERAL HEALTH: feels well no complaints  SKIN: denies any unusual skin lesions or rashes  RESPIRATORY: denies shortness of breath   CARDIOVASCULAR: denies chest pain    GI: denies abdominal pain and denies heartburn  NEURO: denies headaches    EXAM:   /68   Pulse 88   Temp 96.7 °F (35.9 °C)   Resp 18   Wt 233 lb (105.7 kg)   SpO2 97%   BMI 30.74 kg/m²  Body mass index is 30.74 kg/m².      GENERAL: well developed, well nourished,in no apparent distress  SKIN: no rashes,no suspicious lesions  HEENT: atraumatic, normocephalic,ears and throat are clear  NECK: supple,no adenopathy,   LUNGS: clear to auscultation  CARDIO: RRR without murmur  GI: good BS's,no masses, HSM or tenderness, no CVA or suprapubic tenderness.   EXTREMITIES: no cyanosis, clubbing or edema    ASSESSMENT AND PLAN:     Encounter Diagnosis   Name Primary?    Dysuria Yes       Diagnoses and all orders for this visit:    Dysuria  -     Urine Dip, auto without Micro  -      Urine Culture, Routine [E]; Future  -     ciprofloxacin 250 MG Oral Tab; Take 1 tablet (250 mg total) by mouth 2 (two) times daily for 5 days.    Treat as above.     Follow up for physical and DM follow up.     Orders Placed This Encounter   Procedures    Urine Dip, auto without Micro     Order Specific Question:   Release to patient     Answer:   Immediate               Meds & Refills for this Visit:  Requested Prescriptions     Signed Prescriptions Disp Refills    ciprofloxacin 250 MG Oral Tab 10 tablet 0     Sig: Take 1 tablet (250 mg total) by mouth 2 (two) times daily for 5 days.             The patient indicates understanding of these issues and agrees to the plan.

## 2024-04-21 DIAGNOSIS — J30.89 ENVIRONMENTAL AND SEASONAL ALLERGIES: ICD-10-CM

## 2024-04-23 RX ORDER — MONTELUKAST SODIUM 10 MG/1
10 TABLET ORAL NIGHTLY
Qty: 90 TABLET | Refills: 0 | Status: SHIPPED | OUTPATIENT
Start: 2024-04-23

## 2024-05-10 DIAGNOSIS — F39 MOOD DISORDER (HCC): ICD-10-CM

## 2024-05-10 RX ORDER — ESCITALOPRAM OXALATE 10 MG/1
10 TABLET ORAL DAILY
Qty: 90 TABLET | Refills: 0 | Status: SHIPPED | OUTPATIENT
Start: 2024-05-10

## 2024-05-10 NOTE — TELEPHONE ENCOUNTER
Name from pharmacy: Escitalopram Oxalate 10 Mg Tab Auro         Will file in chart as: ESCITALOPRAM 10 MG Oral Tab    Sig: Take 1 tablet (10 mg total) by mouth daily.    Disp: 90 tablet    Refills: 0    Start: 5/10/2024    Class: Normal    Non-formulary For: Mood disorder (HCC)    Last ordered: 3 months ago (1/18/2024) by Dahiana Haro DO    Last refill: 2/11/2024    Rx #: [id number]    Psychiatric Non-Scheduled (Anti-Anxiety) Uqqodf55/10/2024 09:34 AM   Protocol Details In person appointment or virtual visit in the past 6 mos or appointment in next 3 mos    Depression Screening completed within the past 12 months      To be filled at: [id number] - [place], [place] - [address] [place] [address] [phone], [phone]       Last refill 1/18/24    LOV 4/19/24    Medication past protocol

## 2024-05-15 DIAGNOSIS — N52.2 DRUG-INDUCED ERECTILE DYSFUNCTION: ICD-10-CM

## 2024-05-15 DIAGNOSIS — E11.65 TYPE 2 DIABETES MELLITUS WITH HYPERGLYCEMIA, WITHOUT LONG-TERM CURRENT USE OF INSULIN (HCC): ICD-10-CM

## 2024-05-15 NOTE — TELEPHONE ENCOUNTER
Pt failed refill protocol for the following reasons:      Diabetes Medication Protocol Ekjyzc57/15/2024 12:54 PM   Protocol Details Last A1C < 7.5 and within past 6 months        Metformin:  Last refill: 1/18/2024 #180 with 0 refills    No refill protocol for the following medication:  Sildenafil Citrate:  Last refill: 1/18/2024 #10 with 0 refills      Last appt: 4/19/2024   Next appt: No future appointments.      Forward to Dr. Haro, please advise on refills. Thank you.

## 2024-05-16 RX ORDER — SILDENAFIL 50 MG/1
TABLET, FILM COATED ORAL
Qty: 10 TABLET | Refills: 0 | Status: SHIPPED | OUTPATIENT
Start: 2024-05-16

## 2024-05-16 RX ORDER — METFORMIN HYDROCHLORIDE 500 MG/1
500 TABLET, EXTENDED RELEASE ORAL 2 TIMES DAILY WITH MEALS
Qty: 180 TABLET | Refills: 0 | Status: SHIPPED | OUTPATIENT
Start: 2024-05-16

## 2024-07-13 DIAGNOSIS — F39 MOOD DISORDER (HCC): ICD-10-CM

## 2024-07-13 NOTE — TELEPHONE ENCOUNTER
Psychiatric Non-Scheduled (Anti-Anxiety) Rielfh1407/13/2024 09:38 AM   Protocol Details Depression Screening completed within the past 12 months    In person appointment or virtual visit in the past 6 mos or appointment in next 3 mos          LOV 4/19/24     Last refill   Medication Quantity Refills Start End   ESCITALOPRAM 10 MG Oral Tab 90 tablet 0 5/10/2024        No future appointments.

## 2024-07-15 RX ORDER — ESCITALOPRAM OXALATE 10 MG/1
10 TABLET ORAL DAILY
Qty: 90 TABLET | Refills: 0 | Status: SHIPPED | OUTPATIENT
Start: 2024-07-15

## 2024-08-11 DIAGNOSIS — E11.65 TYPE 2 DIABETES MELLITUS WITH HYPERGLYCEMIA, WITHOUT LONG-TERM CURRENT USE OF INSULIN (HCC): ICD-10-CM

## 2024-08-12 RX ORDER — METFORMIN HYDROCHLORIDE 500 MG/1
500 TABLET, EXTENDED RELEASE ORAL 2 TIMES DAILY WITH MEALS
Qty: 180 TABLET | Refills: 0 | Status: SHIPPED | OUTPATIENT
Start: 2024-08-12

## 2024-08-12 NOTE — TELEPHONE ENCOUNTER
Diabetes Medication Protocol Dsljkd8108/11/2024 01:44 PM   Protocol Details Last A1C < 7.5 and within past 6 months    EGFRCR or GFRNAA > 50    GFR in the past 12 months    In person appointment or virtual visit in the past 6 mos or appointment in next 3 mos    Microalbumin procedure in past 12 months or taking ACE/ARB       LOV 4/19/24     Last Refill   metFORMIN  MG Oral Tablet 24 Hr 180 tablet 0 5/16/2024       Labs 4/19/24 Urine, 5/26/23 Labs     No future appointments.

## 2024-09-23 ENCOUNTER — MED REC SCAN ONLY (OUTPATIENT)
Dept: FAMILY MEDICINE CLINIC | Facility: CLINIC | Age: 39
End: 2024-09-23

## 2024-09-23 ENCOUNTER — TELEPHONE (OUTPATIENT)
Dept: FAMILY MEDICINE CLINIC | Facility: CLINIC | Age: 39
End: 2024-09-23

## 2024-09-23 NOTE — TELEPHONE ENCOUNTER
Exam note received from [facility]  Exam date 9/21/24  No retinopathy noted  flowsheet updated  Report to scanning

## 2024-10-13 DIAGNOSIS — F39 MOOD DISORDER (HCC): ICD-10-CM

## 2024-10-14 RX ORDER — ESCITALOPRAM OXALATE 10 MG/1
10 TABLET ORAL DAILY
Qty: 90 TABLET | Refills: 0 | Status: SHIPPED | OUTPATIENT
Start: 2024-10-14

## 2024-10-14 NOTE — TELEPHONE ENCOUNTER
Psychiatric Non-Scheduled (Anti-Anxiety) Ezooqb62/13/2024 11:47 AM   Protocol Details Depression Screening completed within the past 12 months    In person appointment or virtual visit in the past 6 mos or appointment in next 3 mos          LOV 4/19/24     Last Refill   Medication Quantity Refills Start End   ESCITALOPRAM 10 MG Oral Tab 90 tablet 0 7/15/2024        No future appointments.

## 2024-11-10 DIAGNOSIS — E11.65 TYPE 2 DIABETES MELLITUS WITH HYPERGLYCEMIA, WITHOUT LONG-TERM CURRENT USE OF INSULIN (HCC): ICD-10-CM

## 2024-11-11 RX ORDER — METFORMIN HYDROCHLORIDE 500 MG/1
500 TABLET, EXTENDED RELEASE ORAL 2 TIMES DAILY WITH MEALS
Qty: 180 TABLET | Refills: 0 | Status: SHIPPED | OUTPATIENT
Start: 2024-11-11

## 2024-11-11 NOTE — TELEPHONE ENCOUNTER
Diabetes Medication Protocol Qfxcdo69/10/2024 11:12 AM   Protocol Details Last A1C < 7.5 and within past 6 months    In person appointment or virtual visit in the past 6 mos or appointment in next 3 mos    EGFRCR or GFRNAA > 50    GFR in the past 12 months    Microalbumin procedure in past 12 months or taking ACE/ARB       Last refill:   Medication Quantity Refills Start End   METFORMIN  MG Oral Tablet 24 Hr 180 tablet 0 8/12/2024      Last Visit: 4/19/24     Labs 5/26/23     Next Visit: No future appointments.      Forward to Dr. Haro please advise on refills. Thanks.

## 2024-11-13 DIAGNOSIS — E11.65 TYPE 2 DIABETES MELLITUS WITH HYPERGLYCEMIA, WITHOUT LONG-TERM CURRENT USE OF INSULIN (HCC): ICD-10-CM

## 2024-11-13 RX ORDER — METFORMIN HYDROCHLORIDE 500 MG/1
500 TABLET, EXTENDED RELEASE ORAL 2 TIMES DAILY WITH MEALS
Qty: 180 TABLET | Refills: 0 | OUTPATIENT
Start: 2024-11-13

## 2024-11-18 ENCOUNTER — TELEPHONE (OUTPATIENT)
Dept: FAMILY MEDICINE CLINIC | Facility: CLINIC | Age: 39
End: 2024-11-18

## 2024-11-18 DIAGNOSIS — T75.3XXA MOTION SICKNESS, INITIAL ENCOUNTER: Primary | ICD-10-CM

## 2024-11-18 RX ORDER — SCOLOPAMINE TRANSDERMAL SYSTEM 1 MG/1
1 PATCH, EXTENDED RELEASE TRANSDERMAL
Qty: 4 PATCH | Refills: 0 | Status: SHIPPED | OUTPATIENT
Start: 2024-11-18

## 2024-11-18 NOTE — TELEPHONE ENCOUNTER
Pt calling- going on a 7 day cruise to the [facility] this weekend, would like medication for motion sickness.     [facility] in [place].

## 2024-12-20 ENCOUNTER — TELEMEDICINE (OUTPATIENT)
Dept: FAMILY MEDICINE CLINIC | Facility: CLINIC | Age: 39
End: 2024-12-20
Payer: COMMERCIAL

## 2024-12-20 DIAGNOSIS — R41.840 ATTENTION AND CONCENTRATION DEFICIT: Primary | ICD-10-CM

## 2024-12-20 PROCEDURE — 99214 OFFICE O/P EST MOD 30 MIN: CPT | Performed by: FAMILY MEDICINE

## 2024-12-20 NOTE — PROGRESS NOTES
This is a telemedicine visit with live, interactive video and audio.     Patient understands and accepts financial responsibility for any deductible, co-insurance and/or co-pays associated with this service.    SUBJECTIVE  Having difficulty staying focused.   Trouble completing a task. Gets distracted. Does not complete what he is doing.   In 2008 was prescribed adderall for attention and focus.   Stopped taking it after divorce.   Lack of focus came back recently, just in the past week.   Would like to restart medication.   No changes in life.   No diet changes.   No increased caffeine.   No marijuana.   Sleeping at night.   Wife has noticed changes in focus.   Boss has not said anything, but he feels it at work at times.     We do not have records of this diagnosis or treatment for him.     HISTORY:  Past Medical History:    Depression      No past surgical history on file.   Family History   Problem Relation Age of Onset    Cancer Neg     Heart Disease Neg     Stroke Neg     Diabetes Neg       Social History     Socioeconomic History    Marital status:    Tobacco Use    Smoking status: Former    Smokeless tobacco: Never    Tobacco comments:     Occasional cigarette more than 20 years ago.    Vaping Use    Vaping status: Never Used   Substance and Sexual Activity    Alcohol use: Yes     Alcohol/week: 1.0 standard drink of alcohol     Types: 1 Cans of beer per week     Comment: Rarely now.    Drug use: No   Other Topics Concern    Caffeine Concern No    Exercise Yes     Comment: Very active at work.    Seat Belt Yes     Comment: While driving.    Special Diet Yes     Comment: Low carb diet now.    Stress Concern No    Weight Concern No        Allergies[1]   Current Outpatient Medications   Medication Sig Dispense Refill    Scopolamine 1.5mg TD patch 1mg/3days Place 1 patch onto the skin every third day. 4 patch 0    METFORMIN  MG Oral Tablet 24 Hr TAKE ONE TABLET BY MOUTH TWICE DAILY WITH MEALS 180  tablet 0    ESCITALOPRAM 10 MG Oral Tab TAKE ONE TABLET BY MOUTH DAILY 90 tablet 0    Sildenafil Citrate 50 MG Oral Tab 1/2 to 1 tab as needed 30 min before intercourse. 10 tablet 0    montelukast 10 MG Oral Tab Take 1 tablet (10 mg total) by mouth nightly. 90 tablet 0    cyanocobalamin 500 MCG Oral Tab Take 1 tablet (500 mcg total) by mouth daily.      Blood Glucose Monitoring Suppl (CONTOUR NEXT ONE) Does not apply Device TEST ONCE EACH MORNING AND BEFORE BEDTIME (Patient not taking: Reported on 4/19/2024)      Blood Glucose Monitoring Suppl (CONTOUR NEXT MONITOR) w/Device Does not apply Kit 1 each  in the morning and 1 each before bedtime. (Patient not taking: Reported on 4/19/2024) 1 kit 0    Glucose Blood (CONTOUR NEXT TEST) In Vitro Strip Test twice daily as directed. (Patient not taking: Reported on 4/19/2024) 200 each 0    Multiple Vitamins-Minerals (MULTIVITAMIN ADULT OR) Take by mouth.         OBJECTIVE  Physical Exam:   alert, appears stated age, and cooperative, Normocephalic, without obvious abnormality, atraumatic, Speaking in full sentences comfortably, Normal work of breathing, and age appropriate, normal, and logical connections.     ASSESSMENT & PLAN  Diagnoses and all orders for this visit:    Attention and concentration deficit    I do not have documentation of previous diagnosis of adhd or his treatment, so will do screening form and then follow up with him in office. He is due for physical and diabetes follow up as well.       Dahiana Haro, DO    Please note that the following visit was completed using two-way, real-time interactive audio and/or video communication.  This has been done in good priscila to provide continuity of care in the best interest of the provider-patient relationship, due to the ongoing public health crisis/national emergency and because of restrictions of visitation.  There are limitations of this visit as no physical exam could be performed.  Every conscious effort was  taken to allow for sufficient and adequate time.  This billing was spent on reviewing labs, medications, radiology tests and decision making.  Appropriate medical decision-making and tests are ordered as detailed in the plan of care above.             [1]   Allergies  Allergen Reactions    Shellfish-Derived Products ANAPHYLAXIS

## 2025-01-07 ENCOUNTER — OFFICE VISIT (OUTPATIENT)
Dept: FAMILY MEDICINE CLINIC | Facility: CLINIC | Age: 40
End: 2025-01-07
Payer: COMMERCIAL

## 2025-01-07 VITALS
SYSTOLIC BLOOD PRESSURE: 122 MMHG | DIASTOLIC BLOOD PRESSURE: 70 MMHG | WEIGHT: 235 LBS | TEMPERATURE: 97 F | HEART RATE: 76 BPM | RESPIRATION RATE: 19 BRPM | BODY MASS INDEX: 31 KG/M2 | OXYGEN SATURATION: 98 %

## 2025-01-07 DIAGNOSIS — N52.2 DRUG-INDUCED ERECTILE DYSFUNCTION: ICD-10-CM

## 2025-01-07 DIAGNOSIS — F39 MOOD DISORDER (HCC): ICD-10-CM

## 2025-01-07 DIAGNOSIS — R41.840 ATTENTION AND CONCENTRATION DEFICIT: ICD-10-CM

## 2025-01-07 DIAGNOSIS — Z00.00 HEALTHY ADULT ON ROUTINE PHYSICAL EXAMINATION: Primary | ICD-10-CM

## 2025-01-07 DIAGNOSIS — Z23 NEED FOR VACCINATION: ICD-10-CM

## 2025-01-07 DIAGNOSIS — E11.65 TYPE 2 DIABETES MELLITUS WITH HYPERGLYCEMIA, WITHOUT LONG-TERM CURRENT USE OF INSULIN (HCC): ICD-10-CM

## 2025-01-07 DIAGNOSIS — F90.9 ATTENTION DEFICIT HYPERACTIVITY DISORDER (ADHD), UNSPECIFIED ADHD TYPE: ICD-10-CM

## 2025-01-07 LAB
ALBUMIN SERPL-MCNC: 4.9 G/DL (ref 3.2–4.8)
ALBUMIN/GLOB SERPL: 1.5 {RATIO} (ref 1–2)
ALP LIVER SERPL-CCNC: 102 U/L
ALT SERPL-CCNC: 46 U/L
ANION GAP SERPL CALC-SCNC: 6 MMOL/L (ref 0–18)
AST SERPL-CCNC: 35 U/L (ref ?–34)
BASOPHILS # BLD AUTO: 0.07 X10(3) UL (ref 0–0.2)
BASOPHILS NFR BLD AUTO: 0.8 %
BILIRUB SERPL-MCNC: 0.6 MG/DL (ref 0.3–1.2)
BUN BLD-MCNC: 11 MG/DL (ref 9–23)
CALCIUM BLD-MCNC: 10 MG/DL (ref 8.7–10.4)
CHLORIDE SERPL-SCNC: 103 MMOL/L (ref 98–112)
CHOLEST SERPL-MCNC: 240 MG/DL (ref ?–200)
CO2 SERPL-SCNC: 28 MMOL/L (ref 21–32)
CREAT BLD-MCNC: 0.78 MG/DL
CREAT UR-SCNC: 65 MG/DL
EGFRCR SERPLBLD CKD-EPI 2021: 115 ML/MIN/1.73M2 (ref 60–?)
EOSINOPHIL # BLD AUTO: 0.14 X10(3) UL (ref 0–0.7)
EOSINOPHIL NFR BLD AUTO: 1.6 %
ERYTHROCYTE [DISTWIDTH] IN BLOOD BY AUTOMATED COUNT: 12.7 %
EST. AVERAGE GLUCOSE BLD GHB EST-MCNC: 108 MG/DL (ref 68–126)
FASTING PATIENT LIPID ANSWER: NO
FASTING STATUS PATIENT QL REPORTED: NO
GLOBULIN PLAS-MCNC: 3.3 G/DL (ref 2–3.5)
GLUCOSE BLD-MCNC: 88 MG/DL (ref 70–99)
HBA1C MFR BLD: 5.4 % (ref ?–5.7)
HCT VFR BLD AUTO: 46.3 %
HDLC SERPL-MCNC: 56 MG/DL (ref 40–59)
HGB BLD-MCNC: 15.7 G/DL
IMM GRANULOCYTES # BLD AUTO: 0.03 X10(3) UL (ref 0–1)
IMM GRANULOCYTES NFR BLD: 0.3 %
LDLC SERPL CALC-MCNC: 157 MG/DL (ref ?–100)
LYMPHOCYTES # BLD AUTO: 4.17 X10(3) UL (ref 1–4)
LYMPHOCYTES NFR BLD AUTO: 47.2 %
MCH RBC QN AUTO: 30.2 PG (ref 26–34)
MCHC RBC AUTO-ENTMCNC: 33.9 G/DL (ref 31–37)
MCV RBC AUTO: 89 FL
MICROALBUMIN UR-MCNC: 0.5 MG/DL
MICROALBUMIN/CREAT 24H UR-RTO: 7.7 UG/MG (ref ?–30)
MONOCYTES # BLD AUTO: 0.59 X10(3) UL (ref 0.1–1)
MONOCYTES NFR BLD AUTO: 6.7 %
NEUTROPHILS # BLD AUTO: 3.84 X10 (3) UL (ref 1.5–7.7)
NEUTROPHILS # BLD AUTO: 3.84 X10(3) UL (ref 1.5–7.7)
NEUTROPHILS NFR BLD AUTO: 43.4 %
NONHDLC SERPL-MCNC: 184 MG/DL (ref ?–130)
OSMOLALITY SERPL CALC.SUM OF ELEC: 283 MOSM/KG (ref 275–295)
PLATELET # BLD AUTO: 277 10(3)UL (ref 150–450)
POTASSIUM SERPL-SCNC: 3.9 MMOL/L (ref 3.5–5.1)
PROT SERPL-MCNC: 8.2 G/DL (ref 5.7–8.2)
RBC # BLD AUTO: 5.2 X10(6)UL
SODIUM SERPL-SCNC: 137 MMOL/L (ref 136–145)
TRIGL SERPL-MCNC: 151 MG/DL (ref 30–149)
TSI SER-ACNC: 1.4 UIU/ML (ref 0.55–4.78)
VLDLC SERPL CALC-MCNC: 29 MG/DL (ref 0–30)
WBC # BLD AUTO: 8.8 X10(3) UL (ref 4–11)

## 2025-01-07 PROCEDURE — 3074F SYST BP LT 130 MM HG: CPT | Performed by: FAMILY MEDICINE

## 2025-01-07 PROCEDURE — 99213 OFFICE O/P EST LOW 20 MIN: CPT | Performed by: FAMILY MEDICINE

## 2025-01-07 PROCEDURE — 80061 LIPID PANEL: CPT | Performed by: FAMILY MEDICINE

## 2025-01-07 PROCEDURE — 99396 PREV VISIT EST AGE 40-64: CPT | Performed by: FAMILY MEDICINE

## 2025-01-07 PROCEDURE — 83036 HEMOGLOBIN GLYCOSYLATED A1C: CPT | Performed by: FAMILY MEDICINE

## 2025-01-07 PROCEDURE — 80053 COMPREHEN METABOLIC PANEL: CPT | Performed by: FAMILY MEDICINE

## 2025-01-07 PROCEDURE — 3078F DIAST BP <80 MM HG: CPT | Performed by: FAMILY MEDICINE

## 2025-01-07 PROCEDURE — 84443 ASSAY THYROID STIM HORMONE: CPT | Performed by: FAMILY MEDICINE

## 2025-01-07 PROCEDURE — 82570 ASSAY OF URINE CREATININE: CPT | Performed by: FAMILY MEDICINE

## 2025-01-07 PROCEDURE — 82043 UR ALBUMIN QUANTITATIVE: CPT | Performed by: FAMILY MEDICINE

## 2025-01-07 PROCEDURE — 85025 COMPLETE CBC W/AUTO DIFF WBC: CPT | Performed by: FAMILY MEDICINE

## 2025-01-07 RX ORDER — SILDENAFIL 50 MG/1
TABLET, FILM COATED ORAL
Qty: 10 TABLET | Refills: 0 | Status: SHIPPED | OUTPATIENT
Start: 2025-01-07

## 2025-01-07 RX ORDER — METFORMIN HYDROCHLORIDE 500 MG/1
500 TABLET, EXTENDED RELEASE ORAL 2 TIMES DAILY WITH MEALS
Qty: 180 TABLET | Refills: 1 | Status: SHIPPED | OUTPATIENT
Start: 2025-01-07

## 2025-01-07 RX ORDER — DEXTROAMPHETAMINE SACCHARATE, AMPHETAMINE ASPARTATE MONOHYDRATE, DEXTROAMPHETAMINE SULFATE AND AMPHETAMINE SULFATE 2.5; 2.5; 2.5; 2.5 MG/1; MG/1; MG/1; MG/1
10 CAPSULE, EXTENDED RELEASE ORAL EVERY MORNING
Qty: 30 CAPSULE | Refills: 0 | Status: SHIPPED | OUTPATIENT
Start: 2025-01-07 | End: 2025-02-06

## 2025-01-07 RX ORDER — ESCITALOPRAM OXALATE 10 MG/1
10 TABLET ORAL DAILY
Qty: 90 TABLET | Refills: 1 | Status: SHIPPED | OUTPATIENT
Start: 2025-01-07

## 2025-01-07 NOTE — PROGRESS NOTES
Homer Headley is a 39 year old male who presents for a complete physical exam.   HPI:   Pt complains of following up on adhd. .    Adhd was diagnosed in the past. See OV note from 12/20. He did self-report scale. Results are borderline (see scan). Gets tired at work. Hard to stay focused. He was on adderall in the past and would like to try that again.     Due for labs.   DM: not checking sugars. No lows. Feeling well. Doing well with low carb diet. Doing well with metformin.   At home weight is 225. Was wearing clothes and boots today.     Working for [employer] as . Lots of walking and staying activity.     Immunization History   Administered Date(s) Administered    Covid-19 Vaccine Moderna 100 mcg/0.5 ml 01/19/2021, 02/17/2021    Covid-19 Vaccine Moderna 50 Mcg/0.25 Ml 02/04/2022    Influenza 10/14/2013    Tb Intradermal Test 03/26/2014   Pended Date(s) Pended    TDAP 01/07/2025     Wt Readings from Last 6 Encounters:   01/07/25 235 lb (106.6 kg)   04/19/24 233 lb (105.7 kg)   05/26/23 220 lb 8 oz (100 kg)   02/10/23 237 lb (107.5 kg)   01/18/21 251 lb (113.9 kg)   08/25/20 247 lb 11.2 oz (112.4 kg)     Body mass index is 31 kg/m².     Lab Results   Component Value Date     (H) 05/26/2023     (HH) 02/10/2023     (H) 01/18/2021     Lab Results   Component Value Date    CHOLEST 222 (H) 05/26/2023    CHOLEST 172 02/10/2023    CHOLEST 206 (H) 08/25/2020     Lab Results   Component Value Date    HDL 50 05/26/2023    HDL 28 (L) 02/10/2023    HDL 48 08/25/2020     Lab Results   Component Value Date     (H) 05/26/2023    LDL 53 02/10/2023     (H) 08/25/2020     Lab Results   Component Value Date    AST 31 05/26/2023    AST 52 (H) 02/10/2023    AST 52 (H) 01/18/2021     Lab Results   Component Value Date    ALT 54 05/26/2023    ALT 91 (H) 02/10/2023     (H) 01/18/2021     No results found for: \"PSA\"     Current Outpatient Medications   Medication Sig  Dispense Refill    escitalopram 10 MG Oral Tab Take 1 tablet (10 mg total) by mouth daily. 90 tablet 1    metFORMIN  MG Oral Tablet 24 Hr Take 1 tablet (500 mg total) by mouth 2 (two) times daily with meals. 180 tablet 1    Sildenafil Citrate 50 MG Oral Tab 1/2 to 1 tab as needed 30 min before intercourse. 10 tablet 0    amphetamine-dextroamphetamine ER (ADDERALL XR) 10 MG Oral Capsule SR 24 Hr Take 1 capsule (10 mg total) by mouth every morning. 30 capsule 0    Multiple Vitamins-Minerals (MULTIVITAMIN ADULT OR) Take by mouth.        Past Medical History:    Depression      History reviewed. No pertinent surgical history.   Family History   Problem Relation Age of Onset    Cancer Neg     Heart Disease Neg     Stroke Neg     Diabetes Neg       Social History:  Social History     Socioeconomic History    Marital status:    Tobacco Use    Smoking status: Former    Smokeless tobacco: Never    Tobacco comments:     Occasional cigarette more than 20 years ago.    Vaping Use    Vaping status: Never Used   Substance and Sexual Activity    Alcohol use: Yes     Alcohol/week: 1.0 standard drink of alcohol     Types: 1 Cans of beer per week     Comment: Rarely now.    Drug use: No   Other Topics Concern    Caffeine Concern No    Exercise Yes     Comment: Very active at work.    Seat Belt Yes     Comment: While driving.    Special Diet Yes     Comment: Low carb diet now.    Stress Concern No    Weight Concern No     Social Drivers of Health     Food Insecurity: No Food Insecurity (1/7/2025)    NCSS - Food Insecurity     Worried About Running Out of Food in the Last Year: No     Ran Out of Food in the Last Year: No   Transportation Needs: No Transportation Needs (1/7/2025)    NCSS - Transportation     Lack of Transportation: No   Housing Stability: Not At Risk (1/7/2025)    NCSS - Housing/Utilities     Has Housing: Yes     Worried About Losing Housing: No     Unable to Get Utilities: No      Occ: .  : yes. Children: teenage son, who is living with his mom. .   Exercise: walking.  Diet: low carb diet     REVIEW OF SYSTEMS:   GENERAL: feels well otherwise  SKIN: denies any unusual skin lesions  EYES:denies blurred vision or double vision  HEENT: denies nasal congestion, sinus pain or ST  LUNGS: denies shortness of breath with exertion  CARDIOVASCULAR: denies chest pain on exertion  GI: denies abdominal pain,denies heartburn  : denies nocturia or changes in stream  MUSCULOSKELETAL: denies back pain or joint pain   NEURO: denies headaches or dizziness   PSYCHE: denies depression or anxiety, doing well other than HPI   HEMATOLOGIC: denies hx of anemia    EXAM:   /70   Pulse 76   Temp 97.2 °F (36.2 °C) (Temporal)   Resp 19   Wt 235 lb (106.6 kg)   SpO2 98%   BMI 31.00 kg/m²   Body mass index is 31 kg/m².   GENERAL: well developed, well nourished,in no apparent distress  SKIN: no rashes,no suspicious lesions  HEENT: atraumatic, normocephalic,ears and throat are clear  EYES:PERRLA, EOMI,conjunctiva are clear  NECK: supple,no adenopathy   CHEST: no chest tenderness  LUNGS: clear to auscultation  CARDIO: RRR without murmur  GI: good BS's,no masses, HSM or tenderness  : not done   MUSCULOSKELETAL: back is not tender,FROM of the back  EXTREMITIES: no cyanosis, clubbing or edema  NEURO: Oriented times three,cranial nerves are intact,motor and sensory are grossly intact  Bilateral barefoot skin diabetic exam is normal, visualized feet and the appearance is normal.  Bilateral monofilament/sensation of both feet is normal.  Pulsation pedal pulse exam of both lower legs/feet is normal as well.      ASSESSMENT AND PLAN:   Homer Headley is a 39 year old male who presents for a complete physical exam.  Pt's weight is Body mass index is 31 kg/m²., recommended low fat diet and aerobic exercise 30 minutes three times weekly. Health maintenance, will check fasting Lipids, CMP, CBC. Pt not due for screening  colonoscopy.  The patient indicates understanding of these issues and agrees to the plan.  The patient is asked to return for CPX in 1 yr or sooner if needed.     Encounter Diagnoses   Name Primary?    Healthy adult on routine physical examination Yes    Type 2 diabetes mellitus with hyperglycemia, without long-term current use of insulin (HCC)     Need for vaccination     Mood disorder (HCC)     Drug-induced erectile dysfunction     Attention and concentration deficit     Attention deficit hyperactivity disorder (ADHD), unspecified ADHD type    - doing well with lexapro and metformin. Check labs today.  - follow up in 1 month for adhd med check.   - follow up in 6 months for diabetes and anxiety.       Orders Placed This Encounter   Procedures    CBC With Differential With Platelet    Hemoglobin A1C    Comp Metabolic Panel (14)    Lipid Panel    TSH W Reflex To Free T4    Microalb/Creat Ratio, Random Urine    TdaP (Adacel, Boostrix) [30784]    VENIPUNCTURE       Meds & Refills for this Visit:  Requested Prescriptions     Signed Prescriptions Disp Refills    escitalopram 10 MG Oral Tab 90 tablet 1     Sig: Take 1 tablet (10 mg total) by mouth daily.    metFORMIN  MG Oral Tablet 24 Hr 180 tablet 1     Sig: Take 1 tablet (500 mg total) by mouth 2 (two) times daily with meals.    Sildenafil Citrate 50 MG Oral Tab 10 tablet 0     Si/2 to 1 tab as needed 30 min before intercourse.    amphetamine-dextroamphetamine ER (ADDERALL XR) 10 MG Oral Capsule SR 24 Hr 30 capsule 0     Sig: Take 1 capsule (10 mg total) by mouth every morning.       Imaging & Consults:  TETANUS, DIPHTHERIA TOXOIDS AND ACELLULAR PERTUSIS VACCINE (TDAP), >7 YEARS, IM USE

## 2025-01-08 ENCOUNTER — MED REC SCAN ONLY (OUTPATIENT)
Dept: FAMILY MEDICINE CLINIC | Facility: CLINIC | Age: 40
End: 2025-01-08

## 2025-02-06 DIAGNOSIS — F90.9 ATTENTION DEFICIT HYPERACTIVITY DISORDER (ADHD), UNSPECIFIED ADHD TYPE: ICD-10-CM

## 2025-02-06 DIAGNOSIS — R41.840 ATTENTION AND CONCENTRATION DEFICIT: ICD-10-CM

## 2025-02-06 RX ORDER — DEXTROAMPHETAMINE SACCHARATE, AMPHETAMINE ASPARTATE MONOHYDRATE, DEXTROAMPHETAMINE SULFATE AND AMPHETAMINE SULFATE 2.5; 2.5; 2.5; 2.5 MG/1; MG/1; MG/1; MG/1
10 CAPSULE, EXTENDED RELEASE ORAL DAILY
Qty: 30 CAPSULE | Refills: 0 | Status: SHIPPED | OUTPATIENT
Start: 2025-03-09 | End: 2025-04-08

## 2025-02-06 RX ORDER — DEXTROAMPHETAMINE SACCHARATE, AMPHETAMINE ASPARTATE MONOHYDRATE, DEXTROAMPHETAMINE SULFATE AND AMPHETAMINE SULFATE 2.5; 2.5; 2.5; 2.5 MG/1; MG/1; MG/1; MG/1
10 CAPSULE, EXTENDED RELEASE ORAL DAILY
Qty: 30 CAPSULE | Refills: 0 | Status: SHIPPED | OUTPATIENT
Start: 2025-04-09 | End: 2025-05-09

## 2025-02-06 RX ORDER — DEXTROAMPHETAMINE SACCHARATE, AMPHETAMINE ASPARTATE MONOHYDRATE, DEXTROAMPHETAMINE SULFATE AND AMPHETAMINE SULFATE 2.5; 2.5; 2.5; 2.5 MG/1; MG/1; MG/1; MG/1
10 CAPSULE, EXTENDED RELEASE ORAL EVERY MORNING
Qty: 30 CAPSULE | Refills: 0 | Status: SHIPPED | OUTPATIENT
Start: 2025-02-06 | End: 2025-03-08

## 2025-02-06 NOTE — TELEPHONE ENCOUNTER
Spoke with Will at [facility] who states 3 scripts for adderall 10 mg IR have been cancelled    Routed to Dr Haro to advise on adderall ER 10 mg script

## 2025-02-06 NOTE — TELEPHONE ENCOUNTER
PT CALLED AND ADV WENT TO PHARMACY AND WRONG SCRIPT WAS CALLED IN    **PT ADV TODAY WAS HIS LAST DAY OF MEDS**    PT ADV NEEDS :     amphetamine-dextroamphetamine ER (ADDERALL XR) 10 MG Oral Capsule SR 24 Hr     PLEASE SEND TO [facility]     THANK YOU

## 2025-02-06 NOTE — TELEPHONE ENCOUNTER
Patient notified and verbalized understanding.     Patient requesting 3 month scripts sent    Routed to Dr Haro to advise on sending script for March and April

## 2025-04-23 NOTE — TELEPHONE ENCOUNTER
Asthma & COPD Medication Protocol Kzcjfg5404/21/2024 10:37 AM   Protocol Details Asthma Action Score greater than or equal to 20    AAP/ACT given in last 12 months    Appointment in past 6 or next 3 months          LOV 4/19/24    Last Refill   montelukast 10 MG Oral Tab 90 tablet 0 1/29/2024     No future appointments.     April 23, 2025     Andria Chandra per Johanny RYAN : Cholesterol panel is elevated we will just monitor  Noted with low Neutrophils count and Wbc ( low counts can put you at risk for developing infections easier)   Could be nothing but I wants to make sure that its ok I ordered a repeat CBC please have patient complete in 1 month      Remainder of labs looks good   Order in system       Thank you,  EMILY JOSEPH LPN

## 2025-06-09 DIAGNOSIS — R41.840 ATTENTION AND CONCENTRATION DEFICIT: ICD-10-CM

## 2025-06-09 DIAGNOSIS — F39 MOOD DISORDER: ICD-10-CM

## 2025-06-09 DIAGNOSIS — F90.9 ATTENTION DEFICIT HYPERACTIVITY DISORDER (ADHD), UNSPECIFIED ADHD TYPE: ICD-10-CM

## 2025-06-09 RX ORDER — ESCITALOPRAM OXALATE 10 MG/1
10 TABLET ORAL DAILY
Qty: 90 TABLET | Refills: 0 | Status: SHIPPED | OUTPATIENT
Start: 2025-06-09

## 2025-06-09 RX ORDER — DEXTROAMPHETAMINE SACCHARATE, AMPHETAMINE ASPARTATE MONOHYDRATE, DEXTROAMPHETAMINE SULFATE AND AMPHETAMINE SULFATE 2.5; 2.5; 2.5; 2.5 MG/1; MG/1; MG/1; MG/1
10 CAPSULE, EXTENDED RELEASE ORAL DAILY
Qty: 30 CAPSULE | Refills: 0 | Status: SHIPPED | OUTPATIENT
Start: 2025-06-09 | End: 2025-07-09

## 2025-06-09 NOTE — TELEPHONE ENCOUNTER
Controlled Substance Medication Imnsrz1906/09/2025 03:49 PM    This medication is a controlled substance - forward to provider to refill    Medication is active on med list        Psychiatric Non-Scheduled (Anti-Anxiety) Irwvnt1806/09/2025 03:49 PM   Protocol Details In person appointment or virtual visit in the past 6 mos or appointment in next 3 mos    Depression Screening completed within the past 12 months    Medication is active on med list       Last refill:   escitalopram 10 MG Oral Tab 90 tablet 1 1/7/2025     Last Visit: 1/7/25    Next Visit: No future appointments.      Forward to Dr. Haro please advise on refills. Thanks.

## 2025-07-29 DIAGNOSIS — F90.9 ATTENTION DEFICIT HYPERACTIVITY DISORDER (ADHD), UNSPECIFIED ADHD TYPE: ICD-10-CM

## 2025-07-29 DIAGNOSIS — R41.840 ATTENTION AND CONCENTRATION DEFICIT: ICD-10-CM

## 2025-07-29 DIAGNOSIS — E11.65 TYPE 2 DIABETES MELLITUS WITH HYPERGLYCEMIA, WITHOUT LONG-TERM CURRENT USE OF INSULIN (HCC): ICD-10-CM

## 2025-07-29 RX ORDER — DEXTROAMPHETAMINE SACCHARATE, AMPHETAMINE ASPARTATE MONOHYDRATE, DEXTROAMPHETAMINE SULFATE AND AMPHETAMINE SULFATE 2.5; 2.5; 2.5; 2.5 MG/1; MG/1; MG/1; MG/1
10 CAPSULE, EXTENDED RELEASE ORAL DAILY
Qty: 30 CAPSULE | Refills: 0 | Status: SHIPPED | OUTPATIENT
Start: 2025-07-29

## 2025-07-29 RX ORDER — METFORMIN HYDROCHLORIDE 500 MG/1
500 TABLET, EXTENDED RELEASE ORAL 2 TIMES DAILY WITH MEALS
Qty: 180 TABLET | Refills: 0 | Status: SHIPPED | OUTPATIENT
Start: 2025-07-29

## 2025-08-24 DIAGNOSIS — N52.2 DRUG-INDUCED ERECTILE DYSFUNCTION: ICD-10-CM

## 2025-08-28 RX ORDER — SILDENAFIL 50 MG/1
TABLET, FILM COATED ORAL
Qty: 10 TABLET | Refills: 0 | Status: SHIPPED | OUTPATIENT
Start: 2025-08-28

## (undated) DIAGNOSIS — F39 MOOD DISORDER (HCC): ICD-10-CM

## (undated) DIAGNOSIS — N52.2 DRUG-INDUCED ERECTILE DYSFUNCTION: ICD-10-CM

## (undated) NOTE — LETTER
Date: 2/14/2023    Patient Name: Garret Jade          To Whom it may concern: This letter has been written at the patient's request. The above patient was seen at the [facility] for treatment of a medical condition. This patient should be excused from attending work/school from 2/14/23 through 2/16/23. The patient may return to work/school on 2/17/23.        Sincerely,    Jayleen Downs DO

## (undated) NOTE — LETTER
Date: 5/26/2023    Patient Name: Jet Carbajal          To Whom it may concern: This letter has been written at the patient's request. The above patient was seen at the [facility] for treatment of a medical condition. This patient should be excused from attending work/school on 5/26/23.          Sincerely,    Keri Locke, DO

## (undated) NOTE — LETTER
Date: 11/4/2022    Patient Name: Celester Castleman          To Whom it may concern: This letter has been written at the patient's request. The above patient was seen at the [facility] for treatment of a medical condition. This patient should be excused from attending work/school from 11/2/22 through 11/4/22. The patient may return to work/school on 11/7/22.        Sincerely,    Abraham Magallanes DO

## (undated) NOTE — LETTER
09/26/20          Vandana Montemayor   [id number] [address]           Dear Vandana Montemayor     Our records indicate that you have outstanding lab work and or testing that was ordered for you and has not yet been completed:  Lab Frequency Next

## (undated) NOTE — LETTER
Date: 1/18/2024    Patient Name: Homer Headley          To Whom it may concern:    This letter has been written at the patient's request. The above patient was seen at the [facility] for treatment of a medical condition.    This patient should be excused from attending work/school on 1/18/24.        Sincerely,    Dahiana Haro DO

## (undated) NOTE — Clinical Note
Cheryle Baltimore is a new diabetic. A1c of 11. Can you reach out and get him scheduled? Thanks!   Edwin Collado